# Patient Record
Sex: FEMALE | Race: WHITE | Employment: UNEMPLOYED | URBAN - METROPOLITAN AREA
[De-identification: names, ages, dates, MRNs, and addresses within clinical notes are randomized per-mention and may not be internally consistent; named-entity substitution may affect disease eponyms.]

---

## 2018-06-12 PROBLEM — Z34.90 PREGNANCY: Status: ACTIVE | Noted: 2018-06-12

## 2018-06-12 PROBLEM — Z98.891 HISTORY OF CESAREAN DELIVERY: Status: ACTIVE | Noted: 2018-06-12

## 2018-06-12 PROBLEM — O09.30 LATE PRENATAL CARE: Status: ACTIVE | Noted: 2018-06-12

## 2018-06-18 PROBLEM — N39.0 UTI (URINARY TRACT INFECTION): Status: ACTIVE | Noted: 2018-06-18

## 2018-08-09 ENCOUNTER — HOSPITAL ENCOUNTER (OUTPATIENT)
Age: 23
Setting detail: OBSERVATION
Discharge: HOME | End: 2018-08-10
Attending: OBSTETRICS & GYNECOLOGY | Admitting: OBSTETRICS & GYNECOLOGY
Payer: MEDICAID

## 2018-08-09 PROBLEM — R19.7 DIARRHEA: Status: ACTIVE | Noted: 2018-08-09

## 2018-08-09 PROBLEM — O26.899 ANTEPARTUM DEHYDRATION: Status: ACTIVE | Noted: 2018-08-09

## 2018-08-09 PROBLEM — O21.9 NAUSEA AND VOMITING DURING PREGNANCY: Status: ACTIVE | Noted: 2018-08-09

## 2018-08-09 PROBLEM — E86.0 ANTEPARTUM DEHYDRATION: Status: ACTIVE | Noted: 2018-08-09

## 2018-08-09 PROBLEM — E87.6 HYPOKALEMIA DUE TO LOSS OF POTASSIUM: Status: ACTIVE | Noted: 2018-08-09

## 2018-08-09 LAB
ALBUMIN SERPL-MCNC: 2.8 G/DL (ref 3.5–5)
ALBUMIN/GLOB SERPL: 0.7 {RATIO} (ref 1.2–3.5)
ALP SERPL-CCNC: 91 U/L (ref 50–136)
ALT SERPL-CCNC: 20 U/L (ref 12–65)
ANION GAP SERPL CALC-SCNC: 15 MMOL/L (ref 7–16)
AST SERPL-CCNC: 18 U/L (ref 15–37)
BILIRUB SERPL-MCNC: 0.3 MG/DL (ref 0.2–1.1)
BUN SERPL-MCNC: 9 MG/DL (ref 6–23)
CALCIUM SERPL-MCNC: 8.1 MG/DL (ref 8.3–10.4)
CHLORIDE SERPL-SCNC: 103 MMOL/L (ref 98–107)
CO2 SERPL-SCNC: 20 MMOL/L (ref 21–32)
CREAT SERPL-MCNC: 0.46 MG/DL (ref 0.6–1)
GLOBULIN SER CALC-MCNC: 4.2 G/DL (ref 2.3–3.5)
GLUCOSE SERPL-MCNC: 84 MG/DL (ref 65–100)
POTASSIUM SERPL-SCNC: 3.4 MMOL/L (ref 3.5–5.1)
PROT SERPL-MCNC: 7 G/DL (ref 6.3–8.2)
SODIUM SERPL-SCNC: 138 MMOL/L (ref 136–145)

## 2018-08-09 PROCEDURE — 74011250637 HC RX REV CODE- 250/637: Performed by: OBSTETRICS & GYNECOLOGY

## 2018-08-09 PROCEDURE — 96360 HYDRATION IV INFUSION INIT: CPT

## 2018-08-09 PROCEDURE — 77030020254 HC SOL INJ D5LR LACTATED RINGER

## 2018-08-09 PROCEDURE — 74011000250 HC RX REV CODE- 250: Performed by: OBSTETRICS & GYNECOLOGY

## 2018-08-09 PROCEDURE — 80053 COMPREHEN METABOLIC PANEL: CPT

## 2018-08-09 PROCEDURE — 59025 FETAL NON-STRESS TEST: CPT

## 2018-08-09 PROCEDURE — 99218 HC RM OBSERVATION: CPT

## 2018-08-09 PROCEDURE — 96374 THER/PROPH/DIAG INJ IV PUSH: CPT

## 2018-08-09 PROCEDURE — 96375 TX/PRO/DX INJ NEW DRUG ADDON: CPT | Performed by: OBSTETRICS & GYNECOLOGY

## 2018-08-09 PROCEDURE — 96368 THER/DIAG CONCURRENT INF: CPT

## 2018-08-09 PROCEDURE — 96366 THER/PROPH/DIAG IV INF ADDON: CPT

## 2018-08-09 PROCEDURE — 96361 HYDRATE IV INFUSION ADD-ON: CPT

## 2018-08-09 PROCEDURE — 74011258636 HC RX REV CODE- 258/636: Performed by: OBSTETRICS & GYNECOLOGY

## 2018-08-09 PROCEDURE — 74011250636 HC RX REV CODE- 250/636: Performed by: OBSTETRICS & GYNECOLOGY

## 2018-08-09 PROCEDURE — 96376 TX/PRO/DX INJ SAME DRUG ADON: CPT

## 2018-08-09 PROCEDURE — 99285 EMERGENCY DEPT VISIT HI MDM: CPT

## 2018-08-09 PROCEDURE — 96365 THER/PROPH/DIAG IV INF INIT: CPT

## 2018-08-09 RX ORDER — DIPHENOXYLATE HYDROCHLORIDE AND ATROPINE SULFATE 2.5; .025 MG/1; MG/1
1 TABLET ORAL
Status: DISCONTINUED | OUTPATIENT
Start: 2018-08-09 | End: 2018-08-10 | Stop reason: HOSPADM

## 2018-08-09 RX ORDER — DEXTROSE, SODIUM CHLORIDE, SODIUM LACTATE, POTASSIUM CHLORIDE, AND CALCIUM CHLORIDE 5; .6; .31; .03; .02 G/100ML; G/100ML; G/100ML; G/100ML; G/100ML
150 INJECTION, SOLUTION INTRAVENOUS CONTINUOUS
Status: DISCONTINUED | OUTPATIENT
Start: 2018-08-09 | End: 2018-08-10 | Stop reason: HOSPADM

## 2018-08-09 RX ORDER — POTASSIUM CHLORIDE 14.9 MG/ML
20 INJECTION INTRAVENOUS ONCE
Status: COMPLETED | OUTPATIENT
Start: 2018-08-09 | End: 2018-08-09

## 2018-08-09 RX ORDER — SODIUM CHLORIDE 0.9 % (FLUSH) 0.9 %
5-10 SYRINGE (ML) INJECTION AS NEEDED
Status: DISCONTINUED | OUTPATIENT
Start: 2018-08-09 | End: 2018-08-10 | Stop reason: HOSPADM

## 2018-08-09 RX ORDER — SODIUM CHLORIDE, SODIUM LACTATE, POTASSIUM CHLORIDE, CALCIUM CHLORIDE 600; 310; 30; 20 MG/100ML; MG/100ML; MG/100ML; MG/100ML
999 INJECTION, SOLUTION INTRAVENOUS CONTINUOUS
Status: DISCONTINUED | OUTPATIENT
Start: 2018-08-09 | End: 2018-08-09

## 2018-08-09 RX ORDER — ACETAMINOPHEN 325 MG/1
650 TABLET ORAL
Status: DISCONTINUED | OUTPATIENT
Start: 2018-08-09 | End: 2018-08-10 | Stop reason: HOSPADM

## 2018-08-09 RX ORDER — ONDANSETRON 2 MG/ML
4 INJECTION INTRAMUSCULAR; INTRAVENOUS
Status: COMPLETED | OUTPATIENT
Start: 2018-08-09 | End: 2018-08-09

## 2018-08-09 RX ORDER — DEXTROSE, SODIUM CHLORIDE, SODIUM LACTATE, POTASSIUM CHLORIDE, AND CALCIUM CHLORIDE 5; .6; .31; .03; .02 G/100ML; G/100ML; G/100ML; G/100ML; G/100ML
250 INJECTION, SOLUTION INTRAVENOUS CONTINUOUS
Status: DISCONTINUED | OUTPATIENT
Start: 2018-08-09 | End: 2018-08-09

## 2018-08-09 RX ORDER — DIPHENOXYLATE HYDROCHLORIDE AND ATROPINE SULFATE 2.5; .025 MG/1; MG/1
2 TABLET ORAL ONCE
Status: COMPLETED | OUTPATIENT
Start: 2018-08-09 | End: 2018-08-09

## 2018-08-09 RX ORDER — CALCIUM CARBONATE 200(500)MG
1 TABLET,CHEWABLE ORAL AS NEEDED
COMMUNITY
End: 2018-10-29

## 2018-08-09 RX ORDER — ONDANSETRON 2 MG/ML
4 INJECTION INTRAMUSCULAR; INTRAVENOUS ONCE
Status: COMPLETED | OUTPATIENT
Start: 2018-08-09 | End: 2018-08-09

## 2018-08-09 RX ORDER — PROMETHAZINE HYDROCHLORIDE 12.5 MG/1
25 TABLET ORAL
Qty: 10 TAB | Refills: 0 | Status: SHIPPED | OUTPATIENT
Start: 2018-08-09 | End: 2018-08-22 | Stop reason: ALTCHOICE

## 2018-08-09 RX ORDER — SODIUM CHLORIDE 0.9 % (FLUSH) 0.9 %
5-10 SYRINGE (ML) INJECTION EVERY 8 HOURS
Status: DISCONTINUED | OUTPATIENT
Start: 2018-08-09 | End: 2018-08-10 | Stop reason: HOSPADM

## 2018-08-09 RX ADMIN — ONDANSETRON 4 MG: 2 INJECTION, SOLUTION INTRAMUSCULAR; INTRAVENOUS at 14:02

## 2018-08-09 RX ADMIN — DIPHENOXYLATE HYDROCHLORIDE AND ATROPINE SULFATE 1 TABLET: 2.5; .025 TABLET ORAL at 18:12

## 2018-08-09 RX ADMIN — SODIUM CHLORIDE, SODIUM LACTATE, POTASSIUM CHLORIDE, CALCIUM CHLORIDE, AND DEXTROSE MONOHYDRATE 150 ML/HR: 600; 310; 30; 20; 5 INJECTION, SOLUTION INTRAVENOUS at 18:13

## 2018-08-09 RX ADMIN — DIPHENOXYLATE HYDROCHLORIDE AND ATROPINE SULFATE 2 TABLET: 2.5; .025 TABLET ORAL at 15:22

## 2018-08-09 RX ADMIN — PROMETHAZINE HYDROCHLORIDE 12.5 MG: 25 INJECTION INTRAMUSCULAR; INTRAVENOUS at 23:26

## 2018-08-09 RX ADMIN — SODIUM CHLORIDE, SODIUM LACTATE, POTASSIUM CHLORIDE, AND CALCIUM CHLORIDE 999 ML/HR: 600; 310; 30; 20 INJECTION, SOLUTION INTRAVENOUS at 13:55

## 2018-08-09 RX ADMIN — PROMETHAZINE HYDROCHLORIDE 12.5 MG: 25 INJECTION INTRAMUSCULAR; INTRAVENOUS at 18:13

## 2018-08-09 RX ADMIN — SODIUM CHLORIDE, SODIUM LACTATE, POTASSIUM CHLORIDE, CALCIUM CHLORIDE, AND DEXTROSE MONOHYDRATE 500 ML/HR: 600; 310; 30; 20; 5 INJECTION, SOLUTION INTRAVENOUS at 15:08

## 2018-08-09 RX ADMIN — FAMOTIDINE 20 MG: 10 INJECTION INTRAVENOUS at 17:45

## 2018-08-09 RX ADMIN — POTASSIUM CHLORIDE 20 MEQ: 200 INJECTION, SOLUTION INTRAVENOUS at 15:07

## 2018-08-09 RX ADMIN — POTASSIUM CHLORIDE 20 MEQ: 200 INJECTION, SOLUTION INTRAVENOUS at 18:13

## 2018-08-09 RX ADMIN — ONDANSETRON 4 MG: 2 INJECTION, SOLUTION INTRAMUSCULAR; INTRAVENOUS at 17:10

## 2018-08-09 NOTE — PROGRESS NOTES
15:22 Medication Given diphenoxylate-atropine (LOMOTIL) tablet 2 Tab -  Dose: 2 Tab ; Route: Oral ; Scheduled Time: [address] [facility]

## 2018-08-09 NOTE — ED PROVIDER NOTES
Chief Complaint:nausea, vomiting, diarrhea      20 y.o. female  at 27w5d  weeks gestation who is seen for nausea, vomiting and diarrhea. Pt reports that her daughter has been ill with a stomach flu. The pt says she has been feeling porrly since Monday and vomiting since last night. She also reports diarrhea. Denies fever, contractions, vag bleeding or discharge. Denies abd pain other than pain with vomiting. HISTORY:    History   Sexual Activity    Sexual activity: Yes    Partners: Male     No LMP recorded (lmp unknown). Patient is pregnant. Social History     Social History    Marital status: SINGLE     Spouse name: N/A    Number of children: N/A    Years of education: N/A     Occupational History    Not on file. Social History Main Topics    Smoking status: Former Smoker     Quit date:     Smokeless tobacco: Never Used    Alcohol use No    Drug use: No    Sexual activity: Yes     Partners: Male     Other Topics Concern    Not on file     Social History Narrative       Past Surgical History:   Procedure Laterality Date     DELIVERY ONLY      HX TONSILLECTOMY         Past Medical History:   Diagnosis Date    Anemia     Ill-defined condition     hypoglycemia    Neurological disorder     headaches         ROS:  A 12 point review of symptoms negative except for chief complaint as described above. PHYSICAL EXAM:  Blood pressure 122/73, pulse (!) 104, temperature 99 °F (37.2 °C), resp. rate 18, height 5' 2\" (1.575 m), weight 72.6 kg (160 lb), unknown if currently breastfeeding. Constitutional: The patient appears well, alert, oriented x 3. Cardiovascular: Heart RRR, no murmurs.    Respiratory: Lungs clear, no respiratory distress  GI: Abdomen soft, nontender, no guarding  No fundal tenderness  Musculoskeletal: no cva tenderness  Upper ext: no edema, reflexes +2  Lower ext: no edema, neg omer's, reflexes +2  Skin: no rashes or lesions  Psychiatric:Mood/ Affect: appropriate  Genitourinary: SVE:deferred  FHT:appropriate gest age  TOCO:no contractions  UA +++ketones, sp grav greater than 1.025     I personally reviewed pt's medical record including relevant labs and ultrasounds    Assessment/Plan:  21 yo  at 27w5d with gastroenteritis with n, v, d.  With dehydration and low potassium  Treated with iv fluids, iv zofran, iv potassium, lomotil  rx for phenergan for at home; instructions for bland diet  F/u if not improved  Reactive nst

## 2018-08-09 NOTE — IP AVS SNAPSHOT
Summary of Care Report The Summary of Care report has been created to help improve care coordination. Users with access to [employer] or [employer] [address] (Web-based application) may access additional patient information including the Discharge Summary. If you are not currently a [address] user and need more information, please call the number listed below in the [place] [address] section and ask to be connected with Medical Records. Facility Information Name Address Phone [address] [place] [address] [address] [place] [id number] [phone] Patient Information Patient Name Sex GRETTA Torres ([id number]) Female 1995 Discharge Information Admitting Provider Service Area Unit Ladonna Amaya MD / [id number] [facility] [address] / [phone] Discharge Provider Discharge Date/Time Discharge Disposition Destination (none) 2018 (Pending) AHR (none) Patient Language Language ENGLISH [13] Hospital Problems as of 2018  Reviewed: 2018  4:14 PM by Babar Crockett DO Class Noted - Resolved Last Modified POA Active Problems Nausea and vomiting during pregnancy  2018 - Present 2018 by Ladonna Amaya MD Unknown Entered by Ladonna Amaya MD  
  Antepartum dehydration  2018 - Present 2018 by Ladonna Amaya MD Unknown Entered by Ladonna Amaya MD  
  
Non-Hospital Problems as of 2018  Reviewed: 2018  4:14 PM by Babar Crockett DO Class Noted - Resolved Last Modified Active Problems Late prenatal care  2018 - Present 2018 by Christian Chapman Entered by Rhea Carlin Overview Addendum 2018  8:15 AM by Christian Chapman Late [facility] @ 19-wks, due to irregular cycles per patient. Aware of late prenatal care protocol. UDS sent 18:negative Pregnancy  2018 - Present 2018 by Carolina Bullock Entered by Carolina Bullock Overview Signed 2018  3:00 PM by Carolina Bullock 28-wk GTT: 
  
  
  History of  delivery  2018 - Present 2018 by Carolina Bullock Entered by Carolina Bullock Overview Addendum 2018  3:09 PM by Carolina Bullock See op note (media tab) from [address], [place]. Patient would like to discuss [address] UTI (urinary tract infection)  2018 - Present 2018 by Simeon Garcia Entered by Simeon Garcia Overview Addendum 2018  1:53 PM by Simeon Garcia Treated -repeat at next visit- You are allergic to the following Allergen Reactions Amoxicillin Hives Cefprozil Hives Ciprofloxacin Hives Keflex (Cephalexin) Hives Pcn (Penicillins) Hives Sulfa (Sulfonamide Antibiotics) Hives Zithromax (Azithromycin) Hives Current Discharge Medication List  
  
START taking these medications Dose & Instructions Dispensing Information Comments  
 promethazine 12.5 mg tablet Commonly known as:  PHENERGAN Dose:  25 mg Take 2 Tabs by mouth every eight (8) hours as needed for Nausea. Quantity:  10 Tab Refills:  0 CONTINUE these medications which have NOT CHANGED Dose & Instructions Dispensing Information Comments  
 calcium carbonate 200 mg calcium (500 mg) Chew Commonly known as:  TUMS Dose:  1 Tab Take 1 Tab by mouth as needed. Refills:  0 PRENATAL DHA+COMPLETE PRENATAL -300 mg-mcg-mg Cmpk Generic drug:  [id number] adriana-folic-dha Take  by mouth. Refills:  0  
   
 TYLENOL 325 mg tablet Generic drug:  acetaminophen Take  by mouth every four (4) hours as needed for Pain. Refills:  0 Follow-up Information Follow up With Details Comments Contact Info None   None ([phone]) Patient stated that they have no PCP Discharge Instructions Dehydration: Care Instructions Your Care Instructions Dehydration happens when your body loses too much fluid. This might happen when you do not drink enough water or you lose large amounts of fluids from your body because of diarrhea, vomiting, or sweating. Severe dehydration can be life-threatening. Water and minerals called electrolytes help put your body fluids back in balance. Learn the early signs of fluid loss, and drink more fluids to prevent dehydration. Follow-up care is a key part of your treatment and safety. Be sure to make and go to all appointments, and call your doctor if you are having problems. It's also a good idea to know your test results and keep a list of the medicines you take. How can you care for yourself at home? · To prevent dehydration, drink plenty of fluids, enough so that your urine is light yellow or clear like water. Choose water and other caffeine-free clear liquids until you feel better. If you have kidney, heart, or liver disease and have to limit fluids, talk with your doctor before you increase the amount of fluids you drink. · If you do not feel like eating or drinking, try taking small sips of water, sports drinks, or other rehydration drinks. · Get plenty of rest. 
To prevent dehydration · Add more fluids to your diet and daily routine, unless your doctor has told you not to. · During hot weather, drink more fluids. Drink even more fluids if you exercise a lot. Stay away from drinks with alcohol or caffeine. · Watch for the symptoms of dehydration. These include: ¨ A dry, sticky mouth. ¨ Dark yellow urine, and not much of it. ¨ Dry and sunken eyes. ¨ Feeling very tired. · Learn what problems can lead to dehydration. These include: ¨ Diarrhea, fever, and vomiting. ¨ Any illness with a fever, such as pneumonia or the flu. ¨ Activities that cause heavy sweating, such as endurance races and heavy outdoor work in hot or humid weather. ¨ Alcohol or drug abuse or withdrawal. 
¨ Certain medicines, such as cold and allergy pills (antihistamines), diet pills (diuretics), and laxatives. ¨ Certain diseases, such as diabetes, cancer, and heart or kidney disease. When should you call for help? Call 911 anytime you think you may need emergency care. For example, call if: 
  · You passed out (lost consciousness).  
 Call your doctor now or seek immediate medical care if: 
  · You are confused and cannot think clearly.  
  · You are dizzy or lightheaded, or you feel like you may faint.  
  · You have signs of needing more fluids. You have sunken eyes and a dry mouth, and you pass only a little dark urine.  
  · You cannot keep fluids down.  
 Watch closely for changes in your health, and be sure to contact your doctor if: 
  · You are not making tears.  
  · Your skin is very dry and sags slowly back into place after you pinch it.  
  · Your mouth and eyes are very dry. Where can you learn more? Go to http://chandler-annalise.info/. Enter Z817 in the search box to learn more about \"Dehydration: Care Instructions. \" Current as of: 2017 Content Version: 11.7 © 4311-6669 [employer]. Care instructions adapted under license by [employer] (which disclaims liability or warranty for this information). If you have questions about a medical condition or this instruction, always ask your healthcare professional. [place] [address] any warranty or liability for your use of this information. Pregnancy Precautions: Care Instructions Your Care Instructions There is no sure way to prevent labor before your due date ( labor) or to prevent most other pregnancy problems.  But there are things you can do to increase your chances of a healthy pregnancy. Go to your appointments, follow your doctor's advice, and take good care of yourself. Eat well, and exercise (if your doctor agrees). And make sure to drink plenty of water. Follow-up care is a key part of your treatment and safety. Be sure to make and go to all appointments, and call your doctor if you are having problems. It's also a good idea to know your test results and keep a list of the medicines you take. How can you care for yourself at home? · Make sure you go to your prenatal appointments. At each visit, your doctor will check your blood pressure. Your doctor will also check to see if you have protein in your urine. High blood pressure and protein in urine are signs of preeclampsia. This condition can be dangerous for you and your baby. · Drink plenty of fluids, enough so that your urine is light yellow or clear like water. Dehydration can cause contractions. If you have kidney, heart, or liver disease and have to limit fluids, talk with your doctor before you increase the amount of fluids you drink. · Tell your doctor right away if you notice any symptoms of an infection, such as: ¨ Burning when you urinate. ¨ A foul-smelling discharge from your vagina. ¨ Vaginal itching. ¨ Unexplained fever. ¨ Unusual pain or soreness in your uterus or lower belly. · Eat a balanced diet. Include plenty of foods that are high in calcium and iron. ¨ Foods high in calcium include milk, cheese, yogurt, almonds, and broccoli. ¨ Foods high in iron include red meat, shellfish, poultry, eggs, beans, raisins, whole-grain bread, and leafy green vegetables. · Do not smoke. If you need help quitting, talk to your doctor about stop-smoking programs and medicines. These can increase your chances of quitting for good. · Do not drink alcohol or use illegal drugs. · Follow your doctor's directions about activity.  Your doctor will let you know how much, if any, exercise you can do. · Ask your doctor if you can have sex. If you are at risk for early labor, your doctor may ask you to not have sex. · Take care to prevent falls. During pregnancy, your joints are loose, and your balance is off. Sports such as bicycling, skiing, or in-line skating can increase your risk of falling. And don't ride horses or motorcycles, dive, water ski, scuba dive, or parachute jump while you are pregnant. · Avoid getting very hot. Do not use saunas or hot tubs. Avoid staying out in the sun in hot weather for long periods. Take acetaminophen (Tylenol) to lower a high fever. · Do not take any over-the-counter or herbal medicines or supplements without talking to your doctor or pharmacist first. 
When should you call for help? Call 911 anytime you think you may need emergency care. For example, call if: 
  · You passed out (lost consciousness).  
  · You have severe vaginal bleeding.  
  · You have severe pain in your belly or pelvis.  
  · You have had fluid gushing or leaking from your vagina and you know or think the umbilical cord is bulging into your vagina. If this happens, immediately get down on your knees so your rear end (buttocks) is higher than your head. This will decrease the pressure on the cord until help arrives.  
[facility] your doctor now or seek immediate medical care if: 
  · You have signs of preeclampsia, such as: 
¨ Sudden swelling of your face, hands, or feet. ¨ New vision problems (such as dimness or blurring). ¨ A severe headache.  
  · You have any vaginal bleeding.  
  · You have belly pain or cramping.  
  · You have a fever.  
  · You have had regular contractions (with or without pain) for an hour. This means that you have 8 or more within 1 hour or 4 or more in 20 minutes after you change your position and drink fluids.  
  · You have a sudden release of fluid from your vagina.   · You have low back pain or pelvic pressure that does not go away.  
  · You notice that your baby has stopped moving or is moving much less than normal.  
 Watch closely for changes in your health, and be sure to contact your doctor if you have any problems. Where can you learn more? Go to http://chandler-annalise.info/. Enter [id number] in the search box to learn more about \"Pregnancy Precautions: Care Instructions. \" Current as of: November 21, 2017 Content Version: 11.7 © 0044-8712 [employer]. Care instructions adapted under license by [email or web address] (which disclaims liability or warranty for this information). If you have questions about a medical condition or this instruction, always ask your healthcare professional. [address] any warranty or liability for your use of this information. Chart Review Routing History No Routing History on File

## 2018-08-09 NOTE — PROGRESS NOTES
15:07 Medication Stopped lactated Ringers infusion -  Route: IntraVENous ; Line: Peripheral IV 08/09/18 Right Hand ; Scheduled Time: 1507 [address]   15:07 Medication New Bag potassium chloride 20 mEq in 100 ml IVPB -  Dose: 20 mEq ; Rate: 50 mL/hr ; Route: IntraVENous ; Line: Peripheral IV 08/09/18 Right Hand ; Scheduled Time: 1500 [address]   15:08 Medication New Bag dextrose 5% lactated ringers infusion -  Dose: 500 mL/hr ; Rate: 500 mL/hr ; Route: IntraVENous ; Line: Peripheral IV 08/09/18 Right Hand ; Scheduled Time: 1500

## 2018-08-09 NOTE — IP AVS SNAPSHOT
[address] [facility] [address] [facility] 
[phone] Patient: Mahsa Davis MRN: [id number] :1995 A check bri indicates which time of day the medication should be taken. My Medications START taking these medications Instructions Each Dose to Equal  
 Morning Noon Evening Bedtime  
 promethazine 12.5 mg tablet Commonly known as:  PHENERGAN Your last dose was: Your next dose is: Take 2 Tabs by mouth every eight (8) hours as needed for Nausea. 25 mg CONTINUE taking these medications Instructions Each Dose to Equal  
 Morning Noon Evening Bedtime  
 calcium carbonate 200 mg calcium (500 mg) Chew Commonly known as:  TUMS Your last dose was: Your next dose is: Take 1 Tab by mouth as needed. 1 Tab PRENATAL DHA+COMPLETE PRENATAL -300 mg-mcg-mg Cmpk Generic drug:  [id number] adriana-folic-dha Your last dose was: Your next dose is: Take  by mouth. TYLENOL 325 mg tablet Generic drug:  acetaminophen Your last dose was: Your next dose is: Take  by mouth every four (4) hours as needed for Pain. Where to Get Your Medications Information on where to get these meds will be given to you by the nurse or doctor. ! Ask your nurse or doctor about these medications  
  promethazine 12.5 mg tablet

## 2018-08-09 NOTE — PROGRESS NOTES
14:02 Medication Given ondansetron (ZOFRAN) injection 4 mg -  Dose: 4 mg ; Route: IntraVENous ; Line: Peripheral IV 08/09/18 Right Hand ; Scheduled Time: [address] [facility] [place]

## 2018-08-09 NOTE — PROGRESS NOTES
13:55 Medication New Bag lactated Ringers infusion -  Dose: 999 mL/hr ; Rate: 999 mL/hr ; Route: IntraVENous ; Line: Peripheral IV 08/09/18 Right Hand ; Scheduled Time: 1400

## 2018-08-09 NOTE — IP AVS SNAPSHOT
[address] [place]  
[phone] Patient: Linda Velarde MRN: [id number] :1995 About your hospitalization You were admitted on:  2018 You last received care in the:  [id number] [facility] You were discharged on:  2018 Why you were hospitalized Your primary diagnosis was:  Not on File Your diagnoses also included:  Nausea And Vomiting During Pregnancy, Antepartum Dehydration Follow-up Information Follow up With Details Comments Contact Info None   None ([phone]) Patient stated that they have no PCP Your Scheduled Appointments 2018  8:30 AM EDT Glucola Test with [facility] LAB [facility] ([place] [address]) [address] [id number] [phone] 2018  8:45 AM EDT  
ESTIMATED FETAL WEIGHT US with UPS US 2 [facility] ([place] [address]) [address] [facility] [id number] [phone] 2018  9:15 AM EDT  
OB VISIT with Cornell Praks MD  
[facility] ([place] [address]) [address] [address] [phone] Discharge Orders None A check rbi indicates which time of day the medication should be taken. My Medications START taking these medications Instructions Each Dose to Equal  
 Morning Noon Evening Bedtime  
 promethazine 12.5 mg tablet Commonly known as:  PHENERGAN Your last dose was: Your next dose is: Take 2 Tabs by mouth every eight (8) hours as needed for Nausea. 25 mg CONTINUE taking these medications Instructions Each Dose to Equal  
 Morning Noon Evening Bedtime  
 calcium carbonate 200 mg calcium (500 mg) Chew Commonly known as:  TUMS Your last dose was: Your next dose is: Take 1 Tab by mouth as needed. 1 Tab PRENATAL DHA+COMPLETE PRENATAL -300 mg-mcg-mg Cmpk Generic drug:  [id number] adriana-folic-dha Your last dose was: Your next dose is: Take  by mouth. TYLENOL 325 mg tablet Generic drug:  acetaminophen Your last dose was: Your next dose is: Take  by mouth every four (4) hours as needed for Pain. Where to Get Your Medications Information on where to get these meds will be given to you by the nurse or doctor. ! Ask your nurse or doctor about these medications  
  promethazine 12.5 mg tablet Discharge Instructions Dehydration: Care Instructions Your Care Instructions Dehydration happens when your body loses too much fluid. This might happen when you do not drink enough water or you lose large amounts of fluids from your body because of diarrhea, vomiting, or sweating. Severe dehydration can be life-threatening. Water and minerals called electrolytes help put your body fluids back in balance. Learn the early signs of fluid loss, and drink more fluids to prevent dehydration. Follow-up care is a key part of your treatment and safety. Be sure to make and go to all appointments, and call your doctor if you are having problems. It's also a good idea to know your test results and keep a list of the medicines you take. How can you care for yourself at home? · To prevent dehydration, drink plenty of fluids, enough so that your urine is light yellow or clear like water. Choose water and other caffeine-free clear liquids until you feel better. If you have kidney, heart, or liver disease and have to limit fluids, talk with your doctor before you increase the amount of fluids you drink.  
· If you do not feel like eating or drinking, try taking small sips of water, sports drinks, or other rehydration drinks. · Get plenty of rest. 
To prevent dehydration · Add more fluids to your diet and daily routine, unless your doctor has told you not to. · During hot weather, drink more fluids. Drink even more fluids if you exercise a lot. Stay away from drinks with alcohol or caffeine. · Watch for the symptoms of dehydration. These include: ¨ A dry, sticky mouth. ¨ Dark yellow urine, and not much of it. ¨ Dry and sunken eyes. ¨ Feeling very tired. · Learn what problems can lead to dehydration. These include: ¨ Diarrhea, fever, and vomiting. ¨ Any illness with a fever, such as pneumonia or the flu. ¨ Activities that cause heavy sweating, such as endurance races and heavy outdoor work in hot or humid weather. ¨ Alcohol or drug abuse or withdrawal. 
¨ Certain medicines, such as cold and allergy pills (antihistamines), diet pills (diuretics), and laxatives. ¨ Certain diseases, such as diabetes, cancer, and heart or kidney disease. When should you call for help? Call 911 anytime you think you may need emergency care. For example, call if: 
  · You passed out (lost consciousness).  
 Call your doctor now or seek immediate medical care if: 
  · You are confused and cannot think clearly.  
  · You are dizzy or lightheaded, or you feel like you may faint.  
  · You have signs of needing more fluids. You have sunken eyes and a dry mouth, and you pass only a little dark urine.  
  · You cannot keep fluids down.  
 Watch closely for changes in your health, and be sure to contact your doctor if: 
  · You are not making tears.  
  · Your skin is very dry and sags slowly back into place after you pinch it.  
  · Your mouth and eyes are very dry. Where can you learn more? Go to http://chandler-annalise.info/. Enter Q149 in the search box to learn more about \"Dehydration: Care Instructions. \" Current as of: November 20, 2017 Content Version: 11.7 © 0342-1979 [employer]. Care instructions adapted under license by [employer] (which disclaims liability or warranty for this information). If you have questions about a medical condition or this instruction, always ask your healthcare professional. [address] any warranty or liability for your use of this information. Pregnancy Precautions: Care Instructions Your Care Instructions There is no sure way to prevent labor before your due date ( labor) or to prevent most other pregnancy problems. But there are things you can do to increase your chances of a healthy pregnancy. Go to your appointments, follow your doctor's advice, and take good care of yourself. Eat well, and exercise (if your doctor agrees). And make sure to drink plenty of water. Follow-up care is a key part of your treatment and safety. Be sure to make and go to all appointments, and call your doctor if you are having problems. It's also a good idea to know your test results and keep a list of the medicines you take. How can you care for yourself at home? · Make sure you go to your prenatal appointments. At each visit, your doctor will check your blood pressure. Your doctor will also check to see if you have protein in your urine. High blood pressure and protein in urine are signs of preeclampsia. This condition can be dangerous for you and your baby. · Drink plenty of fluids, enough so that your urine is light yellow or clear like water. Dehydration can cause contractions. If you have kidney, heart, or liver disease and have to limit fluids, talk with your doctor before you increase the amount of fluids you drink. · Tell your doctor right away if you notice any symptoms of an infection, such as: ¨ Burning when you urinate. ¨ A foul-smelling discharge from your vagina. ¨ Vaginal itching. ¨ Unexplained fever. ¨ Unusual pain or soreness in your uterus or lower belly. · Eat a balanced diet. Include plenty of foods that are high in calcium and iron. ¨ Foods high in calcium include milk, cheese, yogurt, almonds, and broccoli. ¨ Foods high in iron include red meat, shellfish, poultry, eggs, beans, raisins, whole-grain bread, and leafy green vegetables. · Do not smoke. If you need help quitting, talk to your doctor about stop-smoking programs and medicines. These can increase your chances of quitting for good. · Do not drink alcohol or use illegal drugs. · Follow your doctor's directions about activity. Your doctor will let you know how much, if any, exercise you can do. · Ask your doctor if you can have sex. If you are at risk for early labor, your doctor may ask you to not have sex. · Take care to prevent falls. During pregnancy, your joints are loose, and your balance is off. Sports such as bicycling, skiing, or in-line skating can increase your risk of falling. And don't ride horses or motorcycles, dive, water ski, scuba dive, or parachute jump while you are pregnant. · Avoid getting very hot. Do not use saunas or hot tubs. Avoid staying out in the sun in hot weather for long periods. Take acetaminophen (Tylenol) to lower a high fever. · Do not take any over-the-counter or herbal medicines or supplements without talking to your doctor or pharmacist first. 
When should you call for help? Call 911 anytime you think you may need emergency care. For example, call if: 
  · You passed out (lost consciousness).  
  · You have severe vaginal bleeding.  
  · You have severe pain in your belly or pelvis.  
  · You have had fluid gushing or leaking from your vagina and you know or think the umbilical cord is bulging into your vagina. If this happens, immediately get down on your knees so your rear end (buttocks) is higher than your head. This will decrease the pressure on the cord until help arrives.  
[facility] your doctor now or seek immediate medical care if:   · You have signs of preeclampsia, such as: 
¨ Sudden swelling of your face, hands, or feet. ¨ New vision problems (such as dimness or blurring). ¨ A severe headache.  
  · You have any vaginal bleeding.  
  · You have belly pain or cramping.  
  · You have a fever.  
  · You have had regular contractions (with or without pain) for an hour. This means that you have 8 or more within 1 hour or 4 or more in 20 minutes after you change your position and drink fluids.  
  · You have a sudden release of fluid from your vagina.  
  · You have low back pain or pelvic pressure that does not go away.  
  · You notice that your baby has stopped moving or is moving much less than normal.  
 Watch closely for changes in your health, and be sure to contact your doctor if you have any problems. Where can you learn more? Go to http://chandler-annalise.info/. Enter [id number] in the search box to learn more about \"Pregnancy Precautions: Care Instructions. \" Current as of: November 21, 2017 Content Version: 11.7 © 7781-5109 NI. Care instructions adapted under license by [email or web address] (which disclaims liability or warranty for this information). If you have questions about a medical condition or this instruction, always ask your healthcare professional. [address] any warranty or liability for your use of this information. Introducing [facility]! Dear Jerzy Yung: Thank you for requesting a My eShoe account. Our records indicate that you already have an active My eShoe account. You can access your account anytime at [email or web address] Did you know that you can access your hospital and ER discharge instructions at any time in My eShoe? You can also review all of your test results from your hospital stay or ER visit. Additional Information If you have questions, please visit the Frequently Asked Questions section of the [employer] website at [email or web address] Remember, [employer] is NOT to be used for urgent needs. For medical emergencies, dial 911. Now available from your iPhone and Android! Introducing [employer] As a [facility] patient, I wanted to make you aware of our electronic visit tool called [employer]. Startupeando 24/7 allows you to connect within minutes with a medical provider 24 hours a day, seven days a week via a mobile device or tablet or logging into a secure website from your computer. You can access [employer] from anywhere in the [place]. A virtual visit might be right for you when you have a simple condition and feel like you just dont want to get out of bed, or cant get away from work for an appointment, when your regular [facility] provider is not available (evenings, weekends or holidays), or when youre out of town and need minor care. Electronic visits cost only $49 and if the [facility] provider determines a prescription is needed to treat your condition, one can be electronically transmitted to a nearby pharmacy*. Please take a moment to enroll today if you have not already done so. The enrollment process is free and takes just a few minutes. To enroll, please download the [employer] marixa to your tablet or phone, or visit [email or web address] to enroll on your computer. And, as an [address] [place] [address] patient with a [employer] account, the results of your visits will be scanned into your electronic medical record and your primary care provider will be able to view the scanned results. We urge you to continue to see your regular [facility] provider for your ongoing medical care. And while your primary care provider may not be the one available when you seek a Rico [employer]ples virtual visit, the peace of mind you get from getting a real diagnosis real time can be priceless. For more information on [employer], view our Frequently Asked Questions (FAQs) at [email or web address]. Sincerely, 
 
Beau Powell MD 
Chief Medical Officer [employer] *:  certain medications cannot be prescribed via Rico [employer] Providers Seen During Your Hospitalization Provider Specialty Primary office phone Matt Abdul MD Obstetrics & Gynecology [phone] Your Primary Care Physician (PCP) Primary Care Physician Office Phone Office Fax NONE ** None ** ** None ** You are allergic to the following Allergen Reactions Amoxicillin Hives Cefprozil Hives Ciprofloxacin Hives Keflex (Cephalexin) Hives Pcn (Penicillins) Hives Sulfa (Sulfonamide Antibiotics) Hives Zithromax (Azithromycin) Hives Recent Documentation Height Weight BMI OB Status Smoking Status 1.575 m 72.6 kg 29.26 kg/m2 Pregnant Former Smoker Emergency Contacts Name Discharge Info Relation Home Work Mobile [address] [place] [address] Partner [phone] Patient Belongings The following personal items are in your possession at time of discharge: 
                             
 
  
  
 Please provide this summary of care documentation to your next provider. Signatures-by signing, you are acknowledging that this After Visit Summary has been reviewed with you and you have received a copy. Patient Signature:  ____________________________________________________________ Date:  ____________________________________________________________  
  
[facility] Provider Signature:  ____________________________________________________________ Date:  ____________________________________________________________

## 2018-08-09 NOTE — PROGRESS NOTES
Patient to [facility] with c/o \"stomach bug\" with N/V and diarrhea since Tuesday. Unable to keep food/fluid down. Patient states as soon as she stopped producing appropriate amount of urine, she came.

## 2018-08-09 NOTE — H&P
History & Physical    Name: Allan Denis MRN: [id number]  SSN: [id number]    YOB: 1995  Age: 20 y.o. Sex: female      Subjective:     Reason for Admission:  Pregnancy and nausea, vomiting, diarrhea with dehydration and hypokalemia    History of Present Illness: Ms. Keiry Marinelli is a 20 y. o.female with an estimated gestational age of 33w11d with Estimated Date of Delivery: 11/3/18. Pt with nausea, vomiting , dehydration, hypokalemia. Pt continuing to vomit with diarrhea even after 8 mg iv zofran and po lomotil. No abd pain or fever. No contractions. No vag discharge or bleeding. OB History    Para Term  AB Living   2 1 1   1   SAB TAB Ectopic Molar Multiple Live Births        1      # Outcome Date GA Lbr Richardson/2nd Weight Sex Delivery Anes PTL Lv   2 Current            1 Term 07/02/15 40w6d  3.685 kg F CS-Unspec   ANTWON      Birth Comments: [facility]        Past Medical History:   Diagnosis Date    Anemia     Ill-defined condition     hypoglycemia    Neurological disorder     headaches     Past Surgical History:   Procedure Laterality Date     DELIVERY ONLY      HX TONSILLECTOMY       Social History     Occupational History    Not on file. Social History Main Topics    Smoking status: Former Smoker     Quit date:     Smokeless tobacco: Never Used    Alcohol use No    Drug use: No    Sexual activity: Yes     Partners: Male      Family History   Problem Relation Age of Onset    Asthma Mother     No Known Problems Father     Congenital heart defect Brother        Allergies   Allergen Reactions    Amoxicillin Hives    Cefprozil Hives    Ciprofloxacin Hives    Keflex [Cephalexin] Hives    Pcn [Penicillins] Hives    Sulfa (Sulfonamide Antibiotics) Hives    Zithromax [Azithromycin] Hives     Prior to Admission medications    Medication Sig Start Date End Date Taking?  Authorizing Provider   calcium carbonate (TUMS) 200 mg calcium (500 mg) chew Take 1 Tab by mouth as needed. Yes Historical Provider   promethazine (PHENERGAN) 12.5 mg tablet Take 2 Tabs by mouth every eight (8) hours as needed for Nausea. 18  Yes Francois Cortez MD   [id number] adriana-folic-dha (PRENATAL DHA+COMPLETE PRENATAL) -402 mg-mcg-mg cmpk Take  by mouth. Yes Historical Provider   acetaminophen (TYLENOL) 325 mg tablet Take  by mouth every four (4) hours as needed for Pain. Yes Historical Provider        Review of Systems:  A comprehensive review of systems was negative except for that written in the History of Present Illness. Objective:     Vitals:    Vitals:    18 1342 18 1347   BP:  122/73   Pulse:  (!) 104   Resp:  18   Temp:  99 °F (37.2 °C)   Weight: 72.6 kg (160 lb)    Height: 5' 2\" (1.575 m)       Temp (24hrs), Av °F (37.2 °C), Min:99 °F (37.2 °C), Max:99 °F (37.2 °C)    BP  Min: 122/73  Max: 122/73     Physical Exam:  Patient without distress.   Heart: Regular rate and rhythm  Lung: clear to auscultation throughout lung fields, no wheezes, no rales, no rhonchi and normal respiratory effort  Back: costovertebral angle tenderness absent  Abdomen: soft, nontender  Fundus: soft and non tender  Perineum: blood absent, amniotic fluid absent  Lower Extremities:  - Edema No   - Patellar Reflexes: 2+ bilaterally     Membranes:  Intact  Uterine Activity:  None  Fetal Heart Rate:  Reactive       Lab/Data Review:  Recent Results (from the past 24 hour(s))   METABOLIC PANEL, COMPREHENSIVE    Collection Time: 18  1:59 PM   Result Value Ref Range    Sodium 138 136 - 145 mmol/L    Potassium 3.4 (L) 3.5 - 5.1 mmol/L    Chloride 103 98 - 107 mmol/L    CO2 20 (L) 21 - 32 mmol/L    Anion gap 15 7 - 16 mmol/L    Glucose 84 65 - 100 mg/dL    BUN 9 6 - 23 MG/DL    Creatinine 0.46 (L) 0.6 - 1.0 MG/DL    GFR est AA >60 >60 ml/min/1.73m2    GFR est non-AA >60 >60 ml/min/1.73m2    Calcium 8.1 (L) 8.3 - 10.4 MG/DL    Bilirubin, total 0.3 0.2 - 1.1 MG/DL    ALT (SGPT) 20 12 - 65 U/L    AST (SGOT) 18 15 - 37 U/L    Alk. phosphatase 91 50 - 136 U/L    Protein, total 7.0 6.3 - 8.2 g/dL    Albumin 2.8 (L) 3.5 - 5.0 g/dL    Globulin 4.2 (H) 2.3 - 3.5 g/dL    A-G Ratio 0.7 (L) 1.2 - 3.5         Assessment and Plan:      Active Problems:    Nausea and vomiting during pregnancy (8/9/2018)      Antepartum dehydration (8/9/2018)      Diarrhea (8/9/2018)      Hypokalemia due to loss of potassium (8/9/2018)       Admit for iv hydration and replacement of potassium with pepcid, phenergan, lomotil, zofran  Anticipate d/c in am.    Signed By:  Kellee Alonso MD     August 9, 2018

## 2018-08-09 NOTE — DISCHARGE INSTRUCTIONS
Dehydration: Care Instructions  Your Care Instructions  Dehydration happens when your body loses too much fluid. This might happen when you do not drink enough water or you lose large amounts of fluids from your body because of diarrhea, vomiting, or sweating. Severe dehydration can be life-threatening. Water and minerals called electrolytes help put your body fluids back in balance. Learn the early signs of fluid loss, and drink more fluids to prevent dehydration. Follow-up care is a key part of your treatment and safety. Be sure to make and go to all appointments, and call your doctor if you are having problems. It's also a good idea to know your test results and keep a list of the medicines you take. How can you care for yourself at home? · To prevent dehydration, drink plenty of fluids, enough so that your urine is light yellow or clear like water. Choose water and other caffeine-free clear liquids until you feel better. If you have kidney, heart, or liver disease and have to limit fluids, talk with your doctor before you increase the amount of fluids you drink. · If you do not feel like eating or drinking, try taking small sips of water, sports drinks, or other rehydration drinks. · Get plenty of rest.  To prevent dehydration  · Add more fluids to your diet and daily routine, unless your doctor has told you not to. · During hot weather, drink more fluids. Drink even more fluids if you exercise a lot. Stay away from drinks with alcohol or caffeine. · Watch for the symptoms of dehydration. These include:  ¨ A dry, sticky mouth. ¨ Dark yellow urine, and not much of it. ¨ Dry and sunken eyes. ¨ Feeling very tired. · Learn what problems can lead to dehydration. These include:  ¨ Diarrhea, fever, and vomiting. ¨ Any illness with a fever, such as pneumonia or the flu. ¨ Activities that cause heavy sweating, such as endurance races and heavy outdoor work in hot or humid weather.   ¨ Alcohol or drug abuse or withdrawal.  ¨ Certain medicines, such as cold and allergy pills (antihistamines), diet pills (diuretics), and laxatives. ¨ Certain diseases, such as diabetes, cancer, and heart or kidney disease. When should you call for help? Call 911 anytime you think you may need emergency care. For example, call if:    · You passed out (lost consciousness).    Call your doctor now or seek immediate medical care if:    · You are confused and cannot think clearly.     · You are dizzy or lightheaded, or you feel like you may faint.     · You have signs of needing more fluids. You have sunken eyes and a dry mouth, and you pass only a little dark urine.     · You cannot keep fluids down.    Watch closely for changes in your health, and be sure to contact your doctor if:    · You are not making tears.     · Your skin is very dry and sags slowly back into place after you pinch it.     · Your mouth and eyes are very dry. Where can you learn more? Go to http://chandler-annalise.info/. Enter M208 in the search box to learn more about \"Dehydration: Care Instructions. \"  Current as of: 2017  Content Version: 11.7  © 4693-8357 [employer]. Care instructions adapted under license by [employer] (which disclaims liability or warranty for this information). If you have questions about a medical condition or this instruction, always ask your healthcare professional. [place] [address] any warranty or liability for your use of this information. Pregnancy Precautions: Care Instructions  Your Care Instructions    There is no sure way to prevent labor before your due date ( labor) or to prevent most other pregnancy problems. But there are things you can do to increase your chances of a healthy pregnancy. Go to your appointments, follow your doctor's advice, and take good care of yourself. Eat well, and exercise (if your doctor agrees).  And make sure to drink plenty of water. Follow-up care is a key part of your treatment and safety. Be sure to make and go to all appointments, and call your doctor if you are having problems. It's also a good idea to know your test results and keep a list of the medicines you take. How can you care for yourself at home? · Make sure you go to your prenatal appointments. At each visit, your doctor will check your blood pressure. Your doctor will also check to see if you have protein in your urine. High blood pressure and protein in urine are signs of preeclampsia. This condition can be dangerous for you and your baby. · Drink plenty of fluids, enough so that your urine is light yellow or clear like water. Dehydration can cause contractions. If you have kidney, heart, or liver disease and have to limit fluids, talk with your doctor before you increase the amount of fluids you drink. · Tell your doctor right away if you notice any symptoms of an infection, such as:  ¨ Burning when you urinate. ¨ A foul-smelling discharge from your vagina. ¨ Vaginal itching. ¨ Unexplained fever. ¨ Unusual pain or soreness in your uterus or lower belly. · Eat a balanced diet. Include plenty of foods that are high in calcium and iron. ¨ Foods high in calcium include milk, cheese, yogurt, almonds, and broccoli. ¨ Foods high in iron include red meat, shellfish, poultry, eggs, beans, raisins, whole-grain bread, and leafy green vegetables. · Do not smoke. If you need help quitting, talk to your doctor about stop-smoking programs and medicines. These can increase your chances of quitting for good. · Do not drink alcohol or use illegal drugs. · Follow your doctor's directions about activity. Your doctor will let you know how much, if any, exercise you can do. · Ask your doctor if you can have sex. If you are at risk for early labor, your doctor may ask you to not have sex. · Take care to prevent falls.  During pregnancy, your joints are loose, and your balance is off. Sports such as bicycling, skiing, or in-line skating can increase your risk of falling. And don't ride horses or motorcycles, dive, water ski, scuba dive, or parachute jump while you are pregnant. · Avoid getting very hot. Do not use saunas or hot tubs. Avoid staying out in the sun in hot weather for long periods. Take acetaminophen (Tylenol) to lower a high fever. · Do not take any over-the-counter or herbal medicines or supplements without talking to your doctor or pharmacist first.  When should you call for help? Call 911 anytime you think you may need emergency care. For example, call if:    · You passed out (lost consciousness).     · You have severe vaginal bleeding.     · You have severe pain in your belly or pelvis.     · You have had fluid gushing or leaking from your vagina and you know or think the umbilical cord is bulging into your vagina. If this happens, immediately get down on your knees so your rear end (buttocks) is higher than your head. This will decrease the pressure on the cord until help arrives.   [facility] your doctor now or seek immediate medical care if:    · You have signs of preeclampsia, such as:  ¨ Sudden swelling of your face, hands, or feet. ¨ New vision problems (such as dimness or blurring). ¨ A severe headache.     · You have any vaginal bleeding.     · You have belly pain or cramping.     · You have a fever.     · You have had regular contractions (with or without pain) for an hour. This means that you have 8 or more within 1 hour or 4 or more in 20 minutes after you change your position and drink fluids.     · You have a sudden release of fluid from your vagina.     · You have low back pain or pelvic pressure that does not go away.     · You notice that your baby has stopped moving or is moving much less than normal.    Watch closely for changes in your health, and be sure to contact your doctor if you have any problems. Where can you learn more?   Go to http://chandler-annalise.info/. Enter [id number] in the search box to learn more about \"Pregnancy Precautions: Care Instructions. \"  Current as of: November 21, 2017  Content Version: 11.7  © 8838-7276 [employer]. Care instructions adapted under license by [employer] (which disclaims liability or warranty for this information). If you have questions about a medical condition or this instruction, always ask your healthcare professional. [place] [address] any warranty or liability for your use of this information.

## 2018-08-10 VITALS
HEART RATE: 83 BPM | TEMPERATURE: 98.7 F | RESPIRATION RATE: 18 BRPM | HEIGHT: 62 IN | SYSTOLIC BLOOD PRESSURE: 108 MMHG | DIASTOLIC BLOOD PRESSURE: 66 MMHG | BODY MASS INDEX: 29.44 KG/M2 | WEIGHT: 160 LBS

## 2018-08-10 PROCEDURE — 99218 HC RM OBSERVATION: CPT

## 2018-08-10 PROCEDURE — 74011258636 HC RX REV CODE- 258/636: Performed by: OBSTETRICS & GYNECOLOGY

## 2018-08-10 RX ADMIN — SODIUM CHLORIDE, SODIUM LACTATE, POTASSIUM CHLORIDE, CALCIUM CHLORIDE, AND DEXTROSE MONOHYDRATE 150 ML/HR: 600; 310; 30; 20; 5 INJECTION, SOLUTION INTRAVENOUS at 02:45

## 2018-08-10 NOTE — PROGRESS NOTES
Pt given written and verbal discharge instructions, pt verbalized understanding. Pt discharged home undelivered in no apparent distress. Pt ambulatory to vehicle accompanied by RN.

## 2018-10-25 NOTE — PROGRESS NOTES
Patient ID verified. Allergies, medical history, prenatal record and prior to admission medications verified. Pt instructed to be NPO after midnight. Pt instructed to arrive at hospital @0845,come to entrance C and sign in at the registration desk on the 4th floor. Patient instructed to come to hospital sooner if SROM, labor, or concerning symptoms. Patient verbalized understanding. Questions encouraged and answered.

## 2018-10-26 ENCOUNTER — ANESTHESIA EVENT (OUTPATIENT)
Dept: LABOR AND DELIVERY | Age: 23
DRG: 540 | End: 2018-10-26
Payer: MEDICAID

## 2018-10-27 ENCOUNTER — HOSPITAL ENCOUNTER (INPATIENT)
Age: 23
LOS: 2 days | Discharge: HOME | DRG: 540 | End: 2018-10-29
Attending: OBSTETRICS & GYNECOLOGY | Admitting: OBSTETRICS & GYNECOLOGY
Payer: MEDICAID

## 2018-10-27 ENCOUNTER — ANESTHESIA (OUTPATIENT)
Dept: LABOR AND DELIVERY | Age: 23
DRG: 540 | End: 2018-10-27
Payer: MEDICAID

## 2018-10-27 DIAGNOSIS — Z98.891 HISTORY OF CESAREAN DELIVERY: Primary | ICD-10-CM

## 2018-10-27 PROBLEM — O34.219 H/O CESAREAN SECTION COMPLICATING PREGNANCY: Status: ACTIVE | Noted: 2018-10-27

## 2018-10-27 PROBLEM — Z3A.39 39 WEEKS GESTATION OF PREGNANCY: Status: ACTIVE | Noted: 2018-10-27

## 2018-10-27 LAB
ABO + RH BLD: NORMAL
ARTERIAL PATENCY WRIST A: ABNORMAL
BASE EXCESS BLD CALC-SCNC: 0 MMOL/L
BDY SITE: ABNORMAL
BLOOD GROUP ANTIBODIES SERPL: NORMAL
BODY TEMPERATURE: 98.6
ERYTHROCYTE [DISTWIDTH] IN BLOOD BY AUTOMATED COUNT: 14.7 %
GAS FLOW.O2 O2 DELIVERY SYS: ABNORMAL L/MIN
HCO3 BLD-SCNC: 25.9 MMOL/L (ref 22–26)
HCT VFR BLD AUTO: 37 % (ref 35.8–46.3)
HGB BLD-MCNC: 11.7 G/DL (ref 11.7–15.4)
MCH RBC QN AUTO: 27.1 PG (ref 26.1–32.9)
MCHC RBC AUTO-ENTMCNC: 31.6 G/DL (ref 31.4–35)
MCV RBC AUTO: 85.8 FL (ref 79.6–97.8)
NRBC # BLD: 0 K/UL (ref 0–0.2)
PCO2 BLDCO: 47 MMHG (ref 32–68)
PH BLDCO: 7.35 [PH] (ref 7.15–7.38)
PLATELET # BLD AUTO: 320 K/UL (ref 150–450)
PMV BLD AUTO: 8.4 FL (ref 9.4–12.3)
PO2 BLDCO: 17 MMHG
RBC # BLD AUTO: 4.31 M/UL (ref 4.05–5.2)
SAO2 % BLD: 21 % (ref 95–98)
SERVICE CMNT-IMP: ABNORMAL
SPECIMEN EXP DATE BLD: NORMAL
SPECIMEN TYPE: ABNORMAL
WBC # BLD AUTO: 11.3 K/UL (ref 4.3–11.1)

## 2018-10-27 PROCEDURE — 77030018836 HC SOL IRR NACL ICUM -A: Performed by: OBSTETRICS & GYNECOLOGY

## 2018-10-27 PROCEDURE — 77030032490 HC SLV COMPR SCD KNE COVD -B: Performed by: OBSTETRICS & GYNECOLOGY

## 2018-10-27 PROCEDURE — 74011250637 HC RX REV CODE- 250/637: Performed by: ANESTHESIOLOGY

## 2018-10-27 PROCEDURE — 65270000029 HC RM PRIVATE

## 2018-10-27 PROCEDURE — 77030018846 HC SOL IRR STRL H20 ICUM -A: Performed by: OBSTETRICS & GYNECOLOGY

## 2018-10-27 PROCEDURE — 86901 BLOOD TYPING SEROLOGIC RH(D): CPT

## 2018-10-27 PROCEDURE — 77030002888 HC SUT CHRMC J&J -A: Performed by: OBSTETRICS & GYNECOLOGY

## 2018-10-27 PROCEDURE — 75410000003 HC RECOV DEL/VAG/CSECN EA 0.5 HR: Performed by: OBSTETRICS & GYNECOLOGY

## 2018-10-27 PROCEDURE — 74011000250 HC RX REV CODE- 250: Performed by: ANESTHESIOLOGY

## 2018-10-27 PROCEDURE — 77030003665 HC NDL SPN BBMI -A: Performed by: ANESTHESIOLOGY

## 2018-10-27 PROCEDURE — 74011250636 HC RX REV CODE- 250/636

## 2018-10-27 PROCEDURE — 77030034696 HC CATH URETH FOL 2W BARD -A: Performed by: OBSTETRICS & GYNECOLOGY

## 2018-10-27 PROCEDURE — 74011000250 HC RX REV CODE- 250

## 2018-10-27 PROCEDURE — 85027 COMPLETE CBC AUTOMATED: CPT

## 2018-10-27 PROCEDURE — 4A1HXCZ MONITORING OF PRODUCTS OF CONCEPTION, CARDIAC RATE, EXTERNAL APPROACH: ICD-10-PCS | Performed by: OBSTETRICS & GYNECOLOGY

## 2018-10-27 PROCEDURE — 36415 COLL VENOUS BLD VENIPUNCTURE: CPT

## 2018-10-27 PROCEDURE — 74011250636 HC RX REV CODE- 250/636: Performed by: OBSTETRICS & GYNECOLOGY

## 2018-10-27 PROCEDURE — 82803 BLOOD GASES ANY COMBINATION: CPT

## 2018-10-27 PROCEDURE — 76060000078 HC EPIDURAL ANESTHESIA: Performed by: OBSTETRICS & GYNECOLOGY

## 2018-10-27 PROCEDURE — 77030007880 HC KT SPN EPDRL BBMI -B: Performed by: ANESTHESIOLOGY

## 2018-10-27 PROCEDURE — 76010000391 HC C SECN FIRST 1 HR: Performed by: OBSTETRICS & GYNECOLOGY

## 2018-10-27 PROCEDURE — 74011250636 HC RX REV CODE- 250/636: Performed by: ANESTHESIOLOGY

## 2018-10-27 PROCEDURE — 77030031139 HC SUT VCRL2 J&J -A: Performed by: OBSTETRICS & GYNECOLOGY

## 2018-10-27 RX ORDER — OXYTOCIN/RINGER'S LACTATE 30/500 ML
250 PLASTIC BAG, INJECTION (ML) INTRAVENOUS ONCE
Status: DISCONTINUED | OUTPATIENT
Start: 2018-10-27 | End: 2018-10-27 | Stop reason: HOSPADM

## 2018-10-27 RX ORDER — BUPIVACAINE HYDROCHLORIDE 7.5 MG/ML
INJECTION, SOLUTION INTRASPINAL AS NEEDED
Status: DISCONTINUED | OUTPATIENT
Start: 2018-10-27 | End: 2018-10-27 | Stop reason: HOSPADM

## 2018-10-27 RX ORDER — TRISODIUM CITRATE DIHYDRATE AND CITRIC ACID MONOHYDRATE 500; 334 MG/5ML; MG/5ML
15 SOLUTION ORAL
Status: COMPLETED | OUTPATIENT
Start: 2018-10-27 | End: 2018-10-27

## 2018-10-27 RX ORDER — DEXAMETHASONE SODIUM PHOSPHATE 100 MG/10ML
INJECTION INTRAMUSCULAR; INTRAVENOUS AS NEEDED
Status: DISCONTINUED | OUTPATIENT
Start: 2018-10-27 | End: 2018-10-27 | Stop reason: HOSPADM

## 2018-10-27 RX ORDER — ACETAMINOPHEN 500 MG
1000 TABLET ORAL 3 TIMES DAILY
Status: DISCONTINUED | OUTPATIENT
Start: 2018-10-27 | End: 2018-10-28

## 2018-10-27 RX ORDER — OXYCODONE HYDROCHLORIDE 5 MG/1
5 TABLET ORAL
Status: DISPENSED | OUTPATIENT
Start: 2018-10-27 | End: 2018-10-28

## 2018-10-27 RX ORDER — HALOPERIDOL 5 MG/ML
1 INJECTION INTRAMUSCULAR
Status: DISCONTINUED | OUTPATIENT
Start: 2018-10-27 | End: 2018-10-28

## 2018-10-27 RX ORDER — EPHEDRINE SULFATE 50 MG/ML
INJECTION, SOLUTION INTRAVENOUS AS NEEDED
Status: DISCONTINUED | OUTPATIENT
Start: 2018-10-27 | End: 2018-10-27 | Stop reason: HOSPADM

## 2018-10-27 RX ORDER — SODIUM CHLORIDE 0.9 % (FLUSH) 0.9 %
5-10 SYRINGE (ML) INJECTION AS NEEDED
Status: DISCONTINUED | OUTPATIENT
Start: 2018-10-27 | End: 2018-10-27 | Stop reason: HOSPADM

## 2018-10-27 RX ORDER — SODIUM CHLORIDE, SODIUM LACTATE, POTASSIUM CHLORIDE, CALCIUM CHLORIDE 600; 310; 30; 20 MG/100ML; MG/100ML; MG/100ML; MG/100ML
25 INJECTION, SOLUTION INTRAVENOUS CONTINUOUS
Status: DISCONTINUED | OUTPATIENT
Start: 2018-10-27 | End: 2018-10-28

## 2018-10-27 RX ORDER — KETOROLAC TROMETHAMINE 30 MG/ML
30 INJECTION, SOLUTION INTRAMUSCULAR; INTRAVENOUS EVERY 6 HOURS
Status: DISPENSED | OUTPATIENT
Start: 2018-10-27 | End: 2018-10-28

## 2018-10-27 RX ORDER — SODIUM CHLORIDE, SODIUM LACTATE, POTASSIUM CHLORIDE, CALCIUM CHLORIDE 600; 310; 30; 20 MG/100ML; MG/100ML; MG/100ML; MG/100ML
INJECTION, SOLUTION INTRAVENOUS
Status: DISCONTINUED | OUTPATIENT
Start: 2018-10-27 | End: 2018-10-27 | Stop reason: HOSPADM

## 2018-10-27 RX ORDER — ONDANSETRON 2 MG/ML
4 INJECTION INTRAMUSCULAR; INTRAVENOUS ONCE
Status: COMPLETED | OUTPATIENT
Start: 2018-10-27 | End: 2018-10-27

## 2018-10-27 RX ORDER — GENTAMICIN SULFATE 80 MG/100ML
80 INJECTION, SOLUTION INTRAVENOUS ONCE
Status: DISPENSED | OUTPATIENT
Start: 2018-10-27 | End: 2018-10-27

## 2018-10-27 RX ORDER — CLINDAMYCIN PHOSPHATE 900 MG/50ML
900 INJECTION INTRAVENOUS ONCE
Status: COMPLETED | OUTPATIENT
Start: 2018-10-27 | End: 2018-10-27

## 2018-10-27 RX ORDER — SODIUM CHLORIDE 0.9 % (FLUSH) 0.9 %
5-10 SYRINGE (ML) INJECTION AS NEEDED
Status: DISCONTINUED | OUTPATIENT
Start: 2018-10-27 | End: 2018-10-28

## 2018-10-27 RX ORDER — SODIUM CHLORIDE 0.9 % (FLUSH) 0.9 %
5-10 SYRINGE (ML) INJECTION EVERY 8 HOURS
Status: DISCONTINUED | OUTPATIENT
Start: 2018-10-27 | End: 2018-10-28

## 2018-10-27 RX ORDER — KETOROLAC TROMETHAMINE 30 MG/ML
INJECTION, SOLUTION INTRAMUSCULAR; INTRAVENOUS AS NEEDED
Status: DISCONTINUED | OUTPATIENT
Start: 2018-10-27 | End: 2018-10-27 | Stop reason: HOSPADM

## 2018-10-27 RX ORDER — NALBUPHINE HYDROCHLORIDE 10 MG/ML
2.5 INJECTION, SOLUTION INTRAMUSCULAR; INTRAVENOUS; SUBCUTANEOUS
Status: DISPENSED | OUTPATIENT
Start: 2018-10-27 | End: 2018-10-28

## 2018-10-27 RX ORDER — OXYTOCIN/RINGER'S LACTATE 30/500 ML
PLASTIC BAG, INJECTION (ML) INTRAVENOUS
Status: DISCONTINUED | OUTPATIENT
Start: 2018-10-27 | End: 2018-10-27 | Stop reason: HOSPADM

## 2018-10-27 RX ORDER — MORPHINE SULFATE 0.5 MG/ML
INJECTION, SOLUTION EPIDURAL; INTRATHECAL; INTRAVENOUS AS NEEDED
Status: DISCONTINUED | OUTPATIENT
Start: 2018-10-27 | End: 2018-10-27 | Stop reason: HOSPADM

## 2018-10-27 RX ORDER — HYDROMORPHONE HYDROCHLORIDE 2 MG/ML
0.5 INJECTION, SOLUTION INTRAMUSCULAR; INTRAVENOUS; SUBCUTANEOUS
Status: DISPENSED | OUTPATIENT
Start: 2018-10-27 | End: 2018-10-28

## 2018-10-27 RX ORDER — SODIUM CHLORIDE 0.9 % (FLUSH) 0.9 %
5-10 SYRINGE (ML) INJECTION EVERY 8 HOURS
Status: DISCONTINUED | OUTPATIENT
Start: 2018-10-27 | End: 2018-10-27 | Stop reason: HOSPADM

## 2018-10-27 RX ORDER — DEXTROSE, SODIUM CHLORIDE, SODIUM LACTATE, POTASSIUM CHLORIDE, AND CALCIUM CHLORIDE 5; .6; .31; .03; .02 G/100ML; G/100ML; G/100ML; G/100ML; G/100ML
125 INJECTION, SOLUTION INTRAVENOUS CONTINUOUS
Status: DISCONTINUED | OUTPATIENT
Start: 2018-10-27 | End: 2018-10-27 | Stop reason: HOSPADM

## 2018-10-27 RX ORDER — ONDANSETRON 2 MG/ML
4 INJECTION INTRAMUSCULAR; INTRAVENOUS
Status: DISCONTINUED | OUTPATIENT
Start: 2018-10-27 | End: 2018-10-28

## 2018-10-27 RX ORDER — SIMETHICONE 80 MG
80 TABLET,CHEWABLE ORAL
Status: DISCONTINUED | OUTPATIENT
Start: 2018-10-28 | End: 2018-10-29 | Stop reason: HOSPADM

## 2018-10-27 RX ADMIN — KETOROLAC TROMETHAMINE 30 MG: 30 INJECTION, SOLUTION INTRAMUSCULAR at 18:21

## 2018-10-27 RX ADMIN — NALBUPHINE HYDROCHLORIDE 2.5 MG: 10 INJECTION, SOLUTION INTRAMUSCULAR; INTRAVENOUS; SUBCUTANEOUS at 21:58

## 2018-10-27 RX ADMIN — SODIUM CHLORIDE, SODIUM LACTATE, POTASSIUM CHLORIDE, CALCIUM CHLORIDE: 600; 310; 30; 20 INJECTION, SOLUTION INTRAVENOUS at 11:18

## 2018-10-27 RX ADMIN — DEXAMETHASONE SODIUM PHOSPHATE 10 MG: 100 INJECTION INTRAMUSCULAR; INTRAVENOUS at 10:59

## 2018-10-27 RX ADMIN — MORPHINE SULFATE 200 MCG: 0.5 INJECTION, SOLUTION EPIDURAL; INTRATHECAL; INTRAVENOUS at 10:36

## 2018-10-27 RX ADMIN — SODIUM CHLORIDE, SODIUM LACTATE, POTASSIUM CHLORIDE, CALCIUM CHLORIDE: 600; 310; 30; 20 INJECTION, SOLUTION INTRAVENOUS at 10:31

## 2018-10-27 RX ADMIN — FAMOTIDINE 20 MG: 10 INJECTION, SOLUTION INTRAVENOUS at 10:01

## 2018-10-27 RX ADMIN — BUPIVACAINE HYDROCHLORIDE 1.8 ML: 7.5 INJECTION, SOLUTION INTRASPINAL at 10:36

## 2018-10-27 RX ADMIN — KETOROLAC TROMETHAMINE 30 MG: 30 INJECTION, SOLUTION INTRAMUSCULAR; INTRAVENOUS at 10:59

## 2018-10-27 RX ADMIN — SODIUM CITRATE AND CITRIC ACID MONOHYDRATE 15 ML: 500; 334 SOLUTION ORAL at 10:02

## 2018-10-27 RX ADMIN — ACETAMINOPHEN 1000 MG: 500 TABLET, FILM COATED ORAL at 22:00

## 2018-10-27 RX ADMIN — OXYCODONE HYDROCHLORIDE 5 MG: 5 TABLET ORAL at 18:21

## 2018-10-27 RX ADMIN — EPHEDRINE SULFATE 10 MG: 50 INJECTION, SOLUTION INTRAVENOUS at 11:18

## 2018-10-27 RX ADMIN — OXYCODONE HYDROCHLORIDE 5 MG: 5 TABLET ORAL at 13:48

## 2018-10-27 RX ADMIN — ONDANSETRON 4 MG: 2 INJECTION INTRAMUSCULAR; INTRAVENOUS at 10:01

## 2018-10-27 RX ADMIN — OXYCODONE HYDROCHLORIDE 5 MG: 5 TABLET ORAL at 23:12

## 2018-10-27 RX ADMIN — CLINDAMYCIN PHOSPHATE 900 MG: 900 INJECTION, SOLUTION INTRAVENOUS at 10:08

## 2018-10-27 RX ADMIN — Medication 250 ML/HR: at 10:54

## 2018-10-27 NOTE — ANESTHESIA PREPROCEDURE EVALUATION
Anesthetic History Review of Systems / Medical History Patient summary reviewed and pertinent labs reviewed Pulmonary Within defined limits Neuro/Psych Within defined limits Cardiovascular Exercise tolerance: >4 METS 
  
GI/Hepatic/Renal 
Within defined limits Endo/Other Obesity Other Findings Physical Exam 
 
Airway Mallampati: III 
TM Distance: 4 - 6 cm Neck ROM: normal range of motion Mouth opening: Normal 
 
 Cardiovascular Rhythm: regular Rate: normal 
 
 
Pertinent negatives: No murmur Dental 
No notable dental hx Pulmonary Breath sounds clear to auscultation Abdominal 
 
 
 
 Other Findings Anesthetic Plan ASA: 2 Anesthesia type: spinal 
 
 
 
 
 
Anesthetic plan and risks discussed with: Patient

## 2018-10-27 NOTE — PROGRESS NOTES
13:48 Medication Given oxyCODONE IR (ROXICODONE) tablet 5 mg -  Dose: 5 mg ; Route: Oral  Norberto Neal RN  
18:21 Medication Given ketorolac (TORADOL) injection 30 mg -  Dose: 30 mg ; Route: IntraVENous ; Line: Peripheral IV 10/27/18 Right Forearm ; Scheduled Time: 1800  Norberto Neal RN  
18:21 Medication Given oxyCODONE IR (ROXICODONE) tablet 5 mg -  Dose: 5 mg ; Route: Oral  Norberto Neal RN  
 
Pt wants to get OOB at 1. Per Aakash Bell we may do so at pt request.  Tiffani Bell done. Medicated for pain.

## 2018-10-27 NOTE — PROGRESS NOTES
Long cath removed per Dr Felicity Stroud order and pt request. 100 ml clear fluid removed with syringe.

## 2018-10-27 NOTE — ANESTHESIA PROCEDURE NOTES
Spinal Block Start time: 10/27/2018 10:32 AM 
End time: 10/27/2018 10:36 AM 
Performed by: Elise Hare MD 
Authorized by: Elise Hare MD  
 
Pre-procedure: Indications: primary anesthetic  Preanesthetic Checklist: patient identified, risks and benefits discussed, anesthesia consent, patient being monitored and timeout performed Timeout Time: 10:32 Spinal Block:  
Patient Position:  Seated Prep Region:  Lumbar Prep: chlorhexidine and patient draped Location:  L3-4 Technique:  Single shot Local Dose (mL):  3 Needle:  
Needle Type:  Pencan Needle Gauge:  25 G Attempts:  1 Events: CSF confirmed, no blood with aspiration and no paresthesia Assessment: 
Insertion:  Uncomplicated Patient tolerance:  Patient tolerated the procedure well with no immediate complications 1.8cc heavy bupiv injected with 200mcg duramorph

## 2018-10-27 NOTE — OP NOTES
[facility]  [id number]      INTRAUTERINE PREGNANCY  SECTION FULL OP NOTE        DATE OF PROCEDURE:  10/27/2018    PREOPERATIVE DIAGNOSIS:  gabriel 2018 Repeat  Section    POSTOPERATIVE DIAGNOSIS:  Scheduled repeat  section     ADDITIONAL DIAGNOSES: Viable male infant    PROCEDURE: Low transverse  section    SURGEON:  Tierra Tapia MD    ASSISTANT:  none    ANESTHESIA: Spinal    EBL: 914 cc    COMPLICATIONS: none    OPERATIVE PROCEDURE: Patient was placed on the operating room table in the supine position/left lateral tilt. Time out was done to confirm the operating procedure, surgeon, patient and site. Once confirmed by the team, procedure was started. After having adequate regional anesthesia by spinal injection, the patient was prepped and draped in the usual fashion for abdominal surgery. A Pfannenstiel incision was made and carried down sharply through the skin, subcutaneous tissue, and fascia. Fascia was sharply dissected free from underlying rectus muscles. The peritoneum was sharply entered and extended vertically. DeLee bladder blade was then placed over the bladder. The visceroperitoneal reflection over the lower uterine segment was incised transversely and the bladder flap sharply and bluntly developed. The uterus was incised transversely, then extended bluntly, and the infants head was delivered without difficulty and fundal pressure. Fluid was clear. Cord was clamped and cut. Mouth and nose were suctioned clean. The infant was given to the NICU personnel present at the time of delivery. The placenta was expressed, intact on inspection. The uterus was exteriorized, wrapped in a wet lap square, curetted with a dry square, and closed in a double-layered fashion with #1 chromic. Hemostasis appeared adequate. The cul-de-sac was then irrigated and suctioned clean. The uterus was placed in the abdomen. The gutters were irrigated and suctioned clean.  The peritoneum and rectus muscles were closed with 2-0 chromic. The fascia was closed with 0 vicryl. Running 2-0 plain was used to reapproximate the subcutaneous tissue. 4-0 Vicryl was used in a subcuticular stitch. The patient tolerated the procedure well and went to the recovery room in satisfactory condition.

## 2018-10-27 NOTE — H&P
History & Physical 
 
Name: Tawana Jovel MRN: [id number]  SSN: [id number] YOB: 1995  Age: 24 y.o. Sex: female Subjective:  
 
Estimated Date of Delivery: 11/3/18 OB History  Para Term  AB Living 2 1 1     1 SAB TAB Ectopic Molar Multiple Live Births 1  
  
# Outcome Date GA Lbr Richardson/2nd Weight Sex Delivery Anes PTL Lv  
2 Current 1 Term 07/02/15 40w6d  3.685 kg F CS-Unspec   ANTWON Birth Comments: Edra Matters  
  
 
 
Ms. Shay Davis is admitted with pregnancy at 39w0d for  section due to previous  section. Prenatal course was normal. Please see prenatal records for details. Past Medical History:  
Diagnosis Date  Anemia  Ill-defined condition   
 hypoglycemia  Neurological disorder   
 headaches Past Surgical History:  
Procedure Laterality Date   DELIVERY ONLY    
 HX TONSILLECTOMY   Social History Occupational History  Not on file Tobacco Use  Smoking status: Former Smoker Last attempt to quit:  Years since quittin.8  Smokeless tobacco: Never Used Substance and Sexual Activity  Alcohol use: No  
 Drug use: No  
 Sexual activity: Yes  
  Partners: Male Family History Problem Relation Age of Onset  Asthma Mother  No Known Problems Father  Congenital heart defect Brother Allergies Allergen Reactions  Amoxicillin Hives  Cefprozil Hives  Ciprofloxacin Hives  Keflex [Cephalexin] Hives  Pcn [Penicillins] Hives  Sulfa (Sulfonamide Antibiotics) Hives  Zithromax [Azithromycin] Hives Prior to Admission medications Medication Sig Start Date End Date Taking? Authorizing Provider  
calcium carbonate (TUMS) 200 mg calcium (500 mg) chew Take 1 Tab by mouth as needed.    Yes Provider, Historical  
[id number] adriana-folic-dha (PRENATAL DHA+COMPLETE PRENATAL) [id number] mg-mcg-mg cmpk Take  by mouth. Yes Provider, Historical  
acetaminophen (TYLENOL) 325 mg tablet Take  by mouth every four (4) hours as needed for Pain. Provider, Historical  
  
 
Review of Systems Objective:  
 
Vitals: 
Vitals:  
 10/27/18 5671 10/27/18 0919 BP:  114/78 Pulse:  89 Resp:  18 Temp:  99.4 °F (37.4 °C) Weight: 82.1 kg (181 lb) Height: 5' 2\" (1.575 m) Physical Exam: 
Physical Exam 
Cervical Exam: Closed/Thick/High 
Membranes: Intact Fetal Heart Rate: Reactive Prenatal Labs:  
Lab Results Component Value Date/Time ABO/Rh(D) PENDING 10/27/2018 09:22 AM  
 Rubella, External immune 2018 HBsAg, External negative 2018 HIV, External NR 2018 RPR, External NR 2018 ABO,Rh O positive 2018 Impression/Plan: Active Problems: H/O  section complicating pregnancy () 39 weeks gestation of pregnancy (10/27/2018) Plan:  Admit for  section. Group B Strep was negative. Discussed the risks of surgery including the risks of bleeding, infection, deep vein thrombosis, and surgical injuries to internal organs including but not limited to the bowels, bladder, rectum, and female reproductive organs. The patient understands the risks; any and all questions were answered to the patient's satisfaction. Signed By:  Meka Mcclelland MD   
 2018

## 2018-10-27 NOTE — PROGRESS NOTES
10/27/18 1829 Urine Assessment Urine Color Yellow/straw Urine Appearance Clear Postpartum Assessment Fundus Firm Fundus location At umbilicus; Midline Lochia Pad change;Small;Rubra States she wants to get up after 1900 and get moving around quickly.

## 2018-10-27 NOTE — L&D DELIVERY NOTE
Delivery Summary    Patient: Olga Collazo MRN: [id number]  SSN: [id number]    YOB: 1995  Age: 24 y.o. Sex: female        Information for the patient's :  Sommer Garcia [[id number]]       Labor Events:    Labor: No   Rupture Date: 10/27/2018   Rupture Time: 10:52 AM   Rupture Type AROM   Amniotic Fluid Volume:      Amniotic Fluid Description: Clear None   Induction: None       Augmentation: None   Labor Events: None     Cervical Ripening:     None     Delivery Events:  Episiotomy: None   Laceration(s): None     Repaired: None    Number of Repair Packets:     Suture Type and Size: None     Estimated Blood Loss (ml):  ml       Delivery Date: 10/27/2018    Delivery Time: 10:53 AM  Delivery Type: , Low Transverse   Details    Trial of Labor: No   Primary/Repeat: Repeat   Priority: Routine   Indications:      Incision type:     Sex:  Male     Gestational Age: 39w0d  Delivery Clinician:  Fernanda Crocker  Living Status: Living   Delivery Location: OR            APGARS  One minute Five minutes Ten minutes   Skin color: 1   1        Heart rate: 2   2        Grimace: 2   2        Muscle tone: 2   2        Breathin   2        Totals: 9   9          Presentation: Vertex    Position:        Resuscitation Method:  Suctioning-bulb; Tactile Stimulation     Meconium Stained: None      Cord Information: 3 Vessels  Complications: None  Cord around:    Delayed cord clamping? No  Cord clamped date/time:10/27/2018 10:53 AM  Disposition of Cord Blood: Lab    Blood Gases Sent?: Yes    Placenta:  Date/Time: 10/27/2018 10:54 AM  Removal:        Appearance: Normal     [place] Measurements:  Birth Weight: 3.14 kg      Birth Length: 49.5 cm      Head Circumference: 34 cm      Chest Circumference: 33 cm     Abdominal Girth:       Other Providers:   CAM OZUNA;ROLO OSMAN;ANGELA LOPEZ;BECKY CHAKRABORTY;KALPANA MÉNDEZ;RADHA TITUS;MESHA OLEARY;NORMA TRISTAN [place], Obstetrician;Primary Nurse;Primary  Nurse;Neonatologist;Anesthesiologist;Crna;Respiratory Therapist;Scrub Tech;Scrub Tech             Group B Strep: No results found for: GRBSEXT, GRBSEXT  Information for the patient's :  [facility] [[id number]]   No results found for: ABORH, PCTABR, PCTDIG, BILI, ABORHEXT, ABORH    No results found for: APH, APCO2, APO2, AHCO3, ABEC, ABDC, O2ST, EPHV, PCO2V, PO2V, HCO3V, EBEV, EBDV, SITE, RSCOM

## 2018-10-28 LAB
BASOPHILS # BLD: 0 K/UL (ref 0–0.2)
BASOPHILS NFR BLD: 0 % (ref 0–2)
DIFFERENTIAL METHOD BLD: ABNORMAL
EOSINOPHIL # BLD: 0.1 K/UL (ref 0–0.8)
EOSINOPHIL NFR BLD: 0 % (ref 0.5–7.8)
ERYTHROCYTE [DISTWIDTH] IN BLOOD BY AUTOMATED COUNT: 14.5 %
HCT VFR BLD AUTO: 31.5 % (ref 35.8–46.3)
HGB BLD-MCNC: 9.8 G/DL (ref 11.7–15.4)
IMM GRANULOCYTES # BLD: 0.1 K/UL (ref 0–0.5)
IMM GRANULOCYTES NFR BLD AUTO: 0 % (ref 0–5)
LYMPHOCYTES # BLD: 2.9 K/UL (ref 0.5–4.6)
LYMPHOCYTES NFR BLD: 18 % (ref 13–44)
MCH RBC QN AUTO: 27.1 PG (ref 26.1–32.9)
MCHC RBC AUTO-ENTMCNC: 31.1 G/DL (ref 31.4–35)
MCV RBC AUTO: 87.3 FL (ref 79.6–97.8)
MONOCYTES # BLD: 1.4 K/UL (ref 0.1–1.3)
MONOCYTES NFR BLD: 9 % (ref 4–12)
NEUTS SEG # BLD: 11.3 K/UL (ref 1.7–8.2)
NEUTS SEG NFR BLD: 72 % (ref 43–78)
NRBC # BLD: 0 K/UL (ref 0–0.2)
PLATELET # BLD AUTO: 306 K/UL (ref 150–450)
PMV BLD AUTO: 8.6 FL (ref 9.4–12.3)
RBC # BLD AUTO: 3.61 M/UL (ref 4.05–5.2)
WBC # BLD AUTO: 15.8 K/UL (ref 4.3–11.1)

## 2018-10-28 PROCEDURE — 74011250637 HC RX REV CODE- 250/637: Performed by: OBSTETRICS & GYNECOLOGY

## 2018-10-28 PROCEDURE — 85025 COMPLETE CBC W/AUTO DIFF WBC: CPT

## 2018-10-28 PROCEDURE — 36415 COLL VENOUS BLD VENIPUNCTURE: CPT

## 2018-10-28 PROCEDURE — 65270000029 HC RM PRIVATE

## 2018-10-28 PROCEDURE — 74011250637 HC RX REV CODE- 250/637: Performed by: ANESTHESIOLOGY

## 2018-10-28 PROCEDURE — 74011250636 HC RX REV CODE- 250/636: Performed by: ANESTHESIOLOGY

## 2018-10-28 RX ORDER — CALCIUM CARBONATE 200(500)MG
200 TABLET,CHEWABLE ORAL
Status: DISCONTINUED | OUTPATIENT
Start: 2018-10-28 | End: 2018-10-29 | Stop reason: HOSPADM

## 2018-10-28 RX ORDER — SODIUM CHLORIDE 0.9 % (FLUSH) 0.9 %
5-10 SYRINGE (ML) INJECTION EVERY 8 HOURS
Status: DISCONTINUED | OUTPATIENT
Start: 2018-10-28 | End: 2018-10-28

## 2018-10-28 RX ORDER — NALOXONE HYDROCHLORIDE 0.4 MG/ML
0.4 INJECTION, SOLUTION INTRAMUSCULAR; INTRAVENOUS; SUBCUTANEOUS AS NEEDED
Status: DISCONTINUED | OUTPATIENT
Start: 2018-10-28 | End: 2018-10-29 | Stop reason: HOSPADM

## 2018-10-28 RX ORDER — DOCUSATE SODIUM 100 MG/1
100 CAPSULE, LIQUID FILLED ORAL 2 TIMES DAILY
Status: DISCONTINUED | OUTPATIENT
Start: 2018-10-28 | End: 2018-10-29 | Stop reason: HOSPADM

## 2018-10-28 RX ORDER — OXYCODONE AND ACETAMINOPHEN 7.5; 325 MG/1; MG/1
2 TABLET ORAL
Status: DISCONTINUED | OUTPATIENT
Start: 2018-10-28 | End: 2018-10-29 | Stop reason: HOSPADM

## 2018-10-28 RX ORDER — SODIUM CHLORIDE 0.9 % (FLUSH) 0.9 %
5-10 SYRINGE (ML) INJECTION AS NEEDED
Status: DISCONTINUED | OUTPATIENT
Start: 2018-10-28 | End: 2018-10-28

## 2018-10-28 RX ORDER — SODIUM CHLORIDE, SODIUM LACTATE, POTASSIUM CHLORIDE, CALCIUM CHLORIDE 600; 310; 30; 20 MG/100ML; MG/100ML; MG/100ML; MG/100ML
150 INJECTION, SOLUTION INTRAVENOUS CONTINUOUS
Status: DISCONTINUED | OUTPATIENT
Start: 2018-10-28 | End: 2018-10-28

## 2018-10-28 RX ORDER — ZOLPIDEM TARTRATE 5 MG/1
5 TABLET ORAL
Status: DISCONTINUED | OUTPATIENT
Start: 2018-10-28 | End: 2018-10-29 | Stop reason: HOSPADM

## 2018-10-28 RX ORDER — IBUPROFEN 800 MG/1
800 TABLET ORAL
Status: DISCONTINUED | OUTPATIENT
Start: 2018-10-28 | End: 2018-10-29 | Stop reason: HOSPADM

## 2018-10-28 RX ORDER — OXYCODONE AND ACETAMINOPHEN 7.5; 325 MG/1; MG/1
1 TABLET ORAL
Status: DISCONTINUED | OUTPATIENT
Start: 2018-10-28 | End: 2018-10-29 | Stop reason: HOSPADM

## 2018-10-28 RX ORDER — DIPHENHYDRAMINE HCL 25 MG
25 CAPSULE ORAL
Status: DISCONTINUED | OUTPATIENT
Start: 2018-10-28 | End: 2018-10-29 | Stop reason: HOSPADM

## 2018-10-28 RX ADMIN — KETOROLAC TROMETHAMINE 30 MG: 30 INJECTION, SOLUTION INTRAMUSCULAR at 09:00

## 2018-10-28 RX ADMIN — DOCUSATE SODIUM 100 MG: 100 CAPSULE, LIQUID FILLED ORAL at 16:23

## 2018-10-28 RX ADMIN — OXYCODONE HYDROCHLORIDE AND ACETAMINOPHEN 1 TABLET: 7.5; 325 TABLET ORAL at 16:23

## 2018-10-28 RX ADMIN — OXYCODONE HYDROCHLORIDE AND ACETAMINOPHEN 1 TABLET: 7.5; 325 TABLET ORAL at 20:10

## 2018-10-28 RX ADMIN — ACETAMINOPHEN 1000 MG: 500 TABLET, FILM COATED ORAL at 08:01

## 2018-10-28 RX ADMIN — Medication 10 ML: at 02:48

## 2018-10-28 RX ADMIN — SIMETHICONE CHEW TAB 80 MG 80 MG: 80 TABLET ORAL at 22:04

## 2018-10-28 RX ADMIN — NALBUPHINE HYDROCHLORIDE 2.5 MG: 10 INJECTION, SOLUTION INTRAMUSCULAR; INTRAVENOUS; SUBCUTANEOUS at 08:01

## 2018-10-28 RX ADMIN — OXYCODONE HYDROCHLORIDE 5 MG: 5 TABLET ORAL at 06:28

## 2018-10-28 RX ADMIN — OXYCODONE HYDROCHLORIDE AND ACETAMINOPHEN 2 TABLET: 7.5; 325 TABLET ORAL at 12:26

## 2018-10-28 RX ADMIN — KETOROLAC TROMETHAMINE 30 MG: 30 INJECTION, SOLUTION INTRAMUSCULAR at 02:47

## 2018-10-28 RX ADMIN — IBUPROFEN 800 MG: 800 TABLET ORAL at 22:04

## 2018-10-28 RX ADMIN — IBUPROFEN 800 MG: 800 TABLET ORAL at 16:23

## 2018-10-28 RX ADMIN — DOCUSATE SODIUM 100 MG: 100 CAPSULE, LIQUID FILLED ORAL at 08:01

## 2018-10-28 NOTE — LACTATION NOTE

## 2018-10-28 NOTE — LACTATION NOTE
This note was copied from a baby's chart. Assisted with breastfeeding in cross cradle on L and R.  Baby fed fairly well, latched easily. Demonstrated manual lip flange. Encouraged frequent feeding and watch output. Mom asking about starting to pump for storage and so older child can give baby a bottle. Discussed recommendation to wait at least 3-4 weeks before introducing a bottle if possible. Mom can start to pump for storage as desired. Suggested consistently pumping after 2 feedings each day. Noted mom wants an early discharge.

## 2018-10-28 NOTE — PROGRESS NOTES
Post-Operative Day Number 1 Progress Note Patient doing well post-op day 1 from  delivery without significant complaints. Pain controlled on current medication. Voiding without difficulty, normal lochia. Vitals:   
Patient Vitals for the past 8 hrs: 
 BP Temp Pulse Resp SpO2  
10/28/18 0630 105/60 97.7 °F (36.5 °C) 79 18   
10/28/18 0239 107/66 97.8 °F (36.6 °C) 74 18 97 % Temp (24hrs), Av.9 °F (36.6 °C), Min:97.5 °F (36.4 °C), Max:98.4 °F (36.9 °C) Vital signs stable, afebrile. Exam:  Patient without distress. Abdomen soft, fundus firm at level of umbilicus, non tender. Incision dry and clean without erythema. Lower extremities are negative for swelling, cords or tenderness. Lab/Data Review: CBC:  
Lab Results Component Value Date/Time WBC 15.8 (H) 10/28/2018 06:06 AM  
 HGB 9.8 (L) 10/28/2018 06:06 AM  
 HCT 31.5 (L) 10/28/2018 06:06 AM  
  10/28/2018 06:06 AM  
 
 
Assessment and Plan:  Patient appears to be having uncomplicated post- course. Continue routine post-op care and maternal education.

## 2018-10-28 NOTE — ANESTHESIA POSTPROCEDURE EVALUATION
Procedure(s):  SECTION. Anesthesia Post Evaluation Multimodal analgesia: multimodal analgesia used between 6 hours prior to anesthesia start to PACU discharge Patient location during evaluation: bedside Patient participation: complete - patient participated Level of consciousness: awake and responsive to light touch Pain management: adequate Airway patency: patent Anesthetic complications: no 
Cardiovascular status: acceptable, hemodynamically stable, blood pressure returned to baseline and stable Respiratory status: acceptable, unassisted, spontaneous ventilation and nonlabored ventilation Hydration status: acceptable Visit Vitals /60 (BP 1 Location: Left arm, BP Patient Position: At rest) Pulse 79 Temp 36.5 °C (97.7 °F) Resp 18 Ht 5' 2\" (1.575 m) Wt 82.1 kg (181 lb) SpO2 97% Breastfeeding? Yes  
BMI 33.11 kg/m²

## 2018-10-28 NOTE — PROGRESS NOTES
Shift assessment complete as noted. Patient given pain and itching medication, discussed plan of care including ambulation in hallway and frequent urination . Questions encouraged and answered. Encouraged to call for needs or concerns. Verbalizes understanding.

## 2018-10-28 NOTE — PROGRESS NOTES
Report received from Marshall Rivero RN care assumed. Pt in bed breastfeeding. No complaints at this time. Call light within reach.

## 2018-10-28 NOTE — LACTATION NOTE
Mother wishes to breastfeed infant now. Infant has had several successful latches throughout the day. Mother positioned for comfort. Infant latches to left breast without difficulty.

## 2018-10-28 NOTE — PROGRESS NOTES
Shift assessment complete as noted. Patient denies needs. POC discussed including pain medications. States she has just begun to hurt and rates pain 2/10. Encouraged to call should she need pain medication. Questions encouraged and answered. Encouraged to call for needs or concerns. Verbalizes understanding.

## 2018-10-28 NOTE — PROGRESS NOTES
Discussed the risks and benefits of epidural placement and use with patient including the risk of wet tap and spinal headache, inadequate analgesia, need for replacement of epidural.  After the patient agreed to proceed I ensured the patient had no contraindications to Labor epidural placement including prohibitive heart defects, hypocoagulation, family or personal history of a bleeding disorder. Epidural placement is described in the block note. Frequent monitoring in the first 20-30 minutes following initial placement was performed and patient and RN were instructed to call anytime with any questions.

## 2018-10-28 NOTE — ROUTINE PROCESS
SBAR IN Report: Mother Verbal report received from Annetta Solano RN on this patient, who is now being transferred from L&D for routine progression of care. The patient is wearing a green \"Anesthesia-Duramorph\" band. Report consisted of patient's Situation, Background, Assessment and Recommendations (SBAR). [place] ID bands were compared with the identification form, and verified with the patient and transferring nurse. Information from the SBAR and the [place] Report was reviewed with the transferring nurse; opportunity for questions and clarification provided. Patient currently walking around the room. Long and SCDs already removed, IV capped. Encouraged to attempt to void within 2 hrs and call should she need assistance.

## 2018-10-29 VITALS
HEIGHT: 62 IN | DIASTOLIC BLOOD PRESSURE: 61 MMHG | SYSTOLIC BLOOD PRESSURE: 100 MMHG | BODY MASS INDEX: 33.31 KG/M2 | HEART RATE: 83 BPM | WEIGHT: 181 LBS | RESPIRATION RATE: 16 BRPM | OXYGEN SATURATION: 98 % | TEMPERATURE: 97.5 F

## 2018-10-29 LAB
ARTERIAL PATENCY WRIST A: NORMAL
BASE DEFICIT BLD-SCNC: 2 MMOL/L
BDY SITE: NORMAL
BODY TEMPERATURE: 98.6
GAS FLOW.O2 O2 DELIVERY SYS: NORMAL L/MIN
PCO2 BLDCO: 40 MMHG (ref 32–68)
PH BLDCO: 7.36 [PH] (ref 7.15–7.38)
PO2 BLDCO: 30 MMHG
SERVICE CMNT-IMP: NORMAL
SPECIMEN TYPE: NORMAL

## 2018-10-29 PROCEDURE — 74011250637 HC RX REV CODE- 250/637: Performed by: OBSTETRICS & GYNECOLOGY

## 2018-10-29 RX ORDER — OXYCODONE AND ACETAMINOPHEN 7.5; 325 MG/1; MG/1
1 TABLET ORAL
Qty: 30 TAB | Refills: 0 | Status: SHIPPED | OUTPATIENT
Start: 2018-10-29 | End: 2018-12-18

## 2018-10-29 RX ORDER — IBUPROFEN 800 MG/1
800 TABLET ORAL
Qty: 35 TAB | Refills: 1 | Status: SHIPPED | OUTPATIENT
Start: 2018-10-29 | End: 2018-12-18

## 2018-10-29 RX ADMIN — DOCUSATE SODIUM 100 MG: 100 CAPSULE, LIQUID FILLED ORAL at 09:51

## 2018-10-29 RX ADMIN — OXYCODONE HYDROCHLORIDE AND ACETAMINOPHEN 1 TABLET: 7.5; 325 TABLET ORAL at 10:44

## 2018-10-29 RX ADMIN — IBUPROFEN 800 MG: 800 TABLET ORAL at 12:42

## 2018-10-29 RX ADMIN — SIMETHICONE CHEW TAB 80 MG 80 MG: 80 TABLET ORAL at 09:51

## 2018-10-29 RX ADMIN — OXYCODONE HYDROCHLORIDE AND ACETAMINOPHEN 1 TABLET: 7.5; 325 TABLET ORAL at 00:44

## 2018-10-29 RX ADMIN — IBUPROFEN 800 MG: 800 TABLET ORAL at 06:14

## 2018-10-29 RX ADMIN — OXYCODONE HYDROCHLORIDE AND ACETAMINOPHEN 1 TABLET: 7.5; 325 TABLET ORAL at 06:14

## 2018-10-29 RX ADMIN — SIMETHICONE CHEW TAB 80 MG 80 MG: 80 TABLET ORAL at 12:43

## 2018-10-29 NOTE — DISCHARGE INSTRUCTIONS
DISCHARGE SUMMARY from Nurse    PATIENT INSTRUCTIONS:    What to do at Home:  Recommended activity: Discharge instruction to follow: Activity: Pelvis rest for 6 weeks     No heavy lifting over 15 lbs for 2 weeks     No driving for 2 weeks     No push/pull motion such as sweeping or vacuuming for 2 weeks     No tub baths for 6 weeks     section keep incision clean and dry, may shower as normal with soap and water. Inspect incision every day for signs of infection listed below. Continue to use zain-bottle with every void or bowel movement until comfortable stopping. Change sanitary pad after each urination or bowel movement. Call MD for the following:      Fever over 101 F; pain not relieved by medication; foul smelling vaginal discharge or increase in vaginal bleeding. Redness, swelling, or drainage from  incision. Take medication as prescribed. Follow up with MD as ordered. *  Please give a list of your current medications to your Primary Care Provider. *  Please update this list whenever your medications are discontinued, doses are      changed, or new medications (including over-the-counter products) are added. *  Please carry medication information at all times in case of emergency situations. These are general instructions for a healthy lifestyle:    No smoking/ No tobacco products/ Avoid exposure to second hand smoke  Surgeon General's Warning:  Quitting smoking now greatly reduces serious risk to your health.     Obesity, smoking, and sedentary lifestyle greatly increases your risk for illness    A healthy diet, regular physical exercise & weight monitoring are important for maintaining a healthy lifestyle    You may be retaining fluid if you have a history of heart failure or if you experience any of the following symptoms:  Weight gain of 3 pounds or more overnight or 5 pounds in a week, increased swelling in our hands or feet or shortness of breath while lying flat in bed.  Please call your doctor as soon as you notice any of these symptoms; do not wait until your next office visit. Recognize signs and symptoms of STROKE:    F-face looks uneven    A-arms unable to move or move unevenly    S-speech slurred or non-existent    T-time-call 911 as soon as signs and symptoms begin-DO NOT go       Back to bed or wait to see if you get better-TIME IS BRAIN. Warning Signs of HEART ATTACK     Call 911 if you have these symptoms:   Chest discomfort. Most heart attacks involve discomfort in the center of the chest that lasts more than a few minutes, or that goes away and comes back. It can feel like uncomfortable pressure, squeezing, fullness, or pain.  Discomfort in other areas of the upper body. Symptoms can include pain or discomfort in one or both arms, the back, neck, jaw, or stomach.  Shortness of breath with or without chest discomfort.  Other signs may include breaking out in a cold sweat, nausea, or lightheadedness. Don't wait more than five minutes to call 911 - MINUTES MATTER! Fast action can save your life. Calling 911 is almost always the fastest way to get lifesaving treatment. Emergency Medical Services staff can begin treatment when they arrive -- up to an hour sooner than if someone gets to the hospital by car. The discharge information has been reviewed with the patient. The patient verbalized understanding. Discharge medications reviewed with the patient and appropriate educational materials and side effects teaching were provided. ___________________________________________________________________________________________________________________________________        Section: What to Expect at Home  Your Recovery    A  section, or , is surgery to deliver your baby through a cut, called an incision, that the doctor makes in your lower belly and uterus.   You may have some pain in your lower belly and need pain medicine for 1 to 2 weeks. You can expect some vaginal bleeding for several weeks. You will probably need about 6 weeks to fully recover. It is important to take it easy while the incision is healing. Avoid heavy lifting, strenuous activities, or exercises that strain the belly muscles while you are recovering. Ask a family member or friend for help with housework, cooking, and shopping. This care sheet gives you a general idea about how long it will take for you to recover. But each person recovers at a different pace. Follow the steps below to get better as quickly as possible. How can you care for yourself at home? Activity    · Rest when you feel tired. Getting enough sleep will help you recover.     · Try to walk each day. Start by walking a little more than you did the day before. Bit by bit, increase the amount you walk. Walking boosts blood flow and helps prevent pneumonia, constipation, and blood clots.     · Avoid strenuous activities, such as bicycle riding, jogging, weightlifting, and aerobic exercise, for 6 weeks or until your doctor says it is okay.     · Until your doctor says it is okay, do not lift anything heavier than your baby.     · Do not do sit-ups or other exercises that strain the belly muscles for 6 weeks or until your doctor says it is okay.     · Hold a pillow over your incision when you cough or take deep breaths. This will support your belly and decrease your pain.     · You may shower as usual. Pat the incision dry when you are done.     · You will have some vaginal bleeding. Wear sanitary pads. Do not douche or use tampons until your doctor says it is okay.     · Ask your doctor when you can drive again.     · You will probably need to take at least 6 weeks off work. It depends on the type of work you do and how you feel.     · Ask your doctor when it is okay for you to have sex. Diet    · You can eat your normal diet.  If your stomach is upset, try bland, low-fat foods like plain rice, broiled chicken, toast, and yogurt.     · Drink plenty of fluids (unless your doctor tells you not to).     · You may notice that your bowel movements are not regular right after your surgery. This is common. Try to avoid constipation and straining with bowel movements. You may want to take a fiber supplement every day. If you have not had a bowel movement after a couple of days, ask your doctor about taking a mild laxative.     · If you are breastfeeding, limit alcohol. Alcohol can cause a lack of energy and other health problems for the baby when a breastfeeding woman drinks heavily. It can also get in the way of a mom's ability to feed her baby or to care for the child in other ways. There isn't a lot of research about exactly how much alcohol can harm a baby. Having no alcohol is the safest choice for your baby. If you choose to have a drink now and then, have only one drink, and limit the number of occasions that you have a drink. Wait to breastfeed at least 2 hours after you have a drink to reduce the amount of alcohol the baby may get in the milk. Medicines    · Your doctor will tell you if and when you can restart your medicines. He or she will also give you instructions about taking any new medicines.     · If you take blood thinners, such as warfarin (Coumadin), clopidogrel (Plavix), or aspirin, be sure to talk to your doctor. He or she will tell you if and when to start taking those medicines again. Make sure that you understand exactly what your doctor wants you to do.     · Take pain medicines exactly as directed. ? If the doctor gave you a prescription medicine for pain, take it as prescribed. ? If you are not taking a prescription pain medicine, ask your doctor if you can take an over-the-counter medicine.     · If you think your pain medicine is making you sick to your stomach:  ? Take your medicine after meals (unless your doctor has told you not to). ? Ask your doctor for a different pain medicine.   · If your doctor prescribed antibiotics, take them as directed. Do not stop taking them just because you feel better. You need to take the full course of antibiotics. Incision care    · If you have strips of tape on the incision, leave the tape on for a week or until it falls off.     · Wash the area daily with warm, soapy water, and pat it dry. Don't use hydrogen peroxide or alcohol, which can slow healing. You may cover the area with a gauze bandage if it weeps or rubs against clothing. Change the bandage every day.     · Keep the area clean and dry. Other instructions    · If you breastfeed your baby, you may be more comfortable while you are healing if you place the baby so that he or she is not resting on your belly. Try tucking your baby under your arm, with his or her body along the side you will be feeding on. Support your baby's upper body with your arm. With that hand you can control your baby's head to bring his or her mouth to your breast. This is sometimes called the football hold. Follow-up care is a key part of your treatment and safety. Be sure to make and go to all appointments, and call your doctor if you are having problems. It's also a good idea to know your test results and keep a list of the medicines you take. When should you call for help? Call 911 anytime you think you may need emergency care. For example, call if:    · You passed out (lost consciousness).     · You have chest pain, are short of breath, or cough up blood.    Call your doctor now or seek immediate medical care if:    · You have pain that does not get better after you take pain medicine.     · You have severe vaginal bleeding.     · You are dizzy or lightheaded, or you feel like you may faint.     · You have new or worse pain in your belly or pelvis.     · You have loose stitches, or your incision comes open.     · You have symptoms of infection, such as:  ? Increased pain, swelling, warmth, or redness. ?  Red streaks leading from the incision. ? Pus draining from the incision. ? A fever.     · You have symptoms of a blood clot in your leg (called a deep vein thrombosis), such as:  ? Pain in your calf, back of the knee, thigh, or groin. ? Redness and swelling in your leg or groin.    Watch closely for changes in your health, and be sure to contact your doctor if:    · You do not get better as expected. Where can you learn more? Go to http://chandler-annalise.info/. Enter M806 in the search box to learn more about \" Section: What to Expect at Home. \"  Current as of: 2017  Content Version: 11.8  © 4191-7226 [employer]. Care instructions adapted under license by [employer] (which disclaims liability or warranty for this information). If you have questions about a medical condition or this instruction, always ask your healthcare professional. [address] any warranty or liability for your use of this information.

## 2018-10-29 NOTE — DISCHARGE SUMMARY
Obstetrical Discharge Summary     Name: Ayan Dimas MRN: [id number]  SSN: [id number]    YOB: 1995  Age: 24 y.o. Sex: female      Allergies: Amoxicillin; Cefprozil; Ciprofloxacin; Keflex [cephalexin]; Pcn [penicillins]; Sulfa (sulfonamide antibiotics); and Zithromax [azithromycin]    Admit Date: 10/27/2018    Discharge Date: 10/29/2018     Admitting Physician: Gabriela Caceres MD     Attending Physician:  Alan Allison MD     * Admission Diagnoses: gabriel 2018 Repeat  Section;39 weeks gestation of pregnancy;H/O  section complicating pregnancy    * Discharge Diagnoses:   Information for the patient's :  [facility] [[id number]]   Delivery of a 6 lb 14.8 oz (3.14 kg) male infant via , Low Transverse on 10/27/2018 at 10:53 AM  by . Apgars were 9 and 9. Additional Diagnoses:   Hospital Problems as of 10/29/2018 Date Reviewed: 10/17/2018          Codes Class Noted - Resolved POA    * (Principal) H/O  section complicating pregnancy VFF-18-TF: O34.219  ICD-9-CM: 654.20  10/27/2018 - Present Unknown        39 weeks gestation of pregnancy ICD-10-CM: Z3A.39  ICD-9-CM: V22.2  10/27/2018 - Present Unknown             Lab Results   Component Value Date/Time    ABO/Rh(D) O POSITIVE 10/27/2018 09:22 AM    Rubella, External immune 2018    ABO,Rh O positive 2018    There is no immunization history for the selected administration types on file for this patient.     * Procedures:   Procedure(s) with comments:   SECTION - gabriel 2018 Repeat  Section      Cate Pel  Depression Scale  I have been able to laugh and see the funny side of things: As much as I always could  I have looked forward with enjoyment to things: As much as I ever did  I have blamed myself unnecessarily when things went wrong: Yes, some of the time  I have been anxious or worried for no good reason: Hardly ever  I have felt scared or panicky for no very good reason: No, not at all  Things have been getting on top of me: No, I have been coping as well as ever  I have been so unhappy that I have had difficulty sleeping: No, not at all  I have felt sad or miserable: No, not at all  I have been so unhappy that I have been crying: No, never  The thought of harming myself has occurred to me: Never  Total Score: 3    * Discharge Condition: stable    * Hospital Course: Normal hospital course following the delivery. * Disposition: Home    Discharge Medications:   Current Discharge Medication List      START taking these medications    Details   ibuprofen (MOTRIN) 800 mg tablet Take 1 Tab by mouth every eight (8) hours as needed. Qty: 35 Tab, Refills: 1      oxyCODONE-acetaminophen (PERCOCET 7.5) 7.5-325 mg per tablet Take 1 Tab by mouth every four (4) hours as needed. Max Daily Amount: 6 Tabs. Qty: 30 Tab, Refills: 0    Associated Diagnoses: History of  delivery         CONTINUE these medications which have NOT CHANGED    Details   [id number] adriana-folic-dha (PRENATAL DHA+COMPLETE PRENATAL) -300 mg-mcg-mg cmpk Take  by mouth. STOP taking these medications       calcium carbonate (TUMS) 200 mg calcium (500 mg) chew Comments:   Reason for Stopping:         acetaminophen (TYLENOL) 325 mg tablet Comments:   Reason for Stopping:               * Follow-up Care/Patient Instructions:   Activity: No sex for 6 weeks  Diet: Regular Diet  Wound Care: As directed    Follow-up Information    None          Signed By:  Marita Benites DO     2018

## 2018-10-29 NOTE — LACTATION NOTE
This note was copied from a baby's chart. In to see mom and infant for discharge. Mom states infant latching and nursing well overall. Discussed w/ Pediatrician as well, but as infant nearing 10% wt loss but overall baby looks well- reviewed recommendation to mom to insurance pump 10 minutes behind every other day time feeding for next few days until milk more fully in and offer back to infant to help bring her milk in quicker and stronger and give baby a few extra calories. Mom in agreeance. Has personal pump at home to use. Reviewed discharge instructions and how to manage period of engorgement. Sent parents home with syringes and reviewed curve tip syringe finger feeding. All questions answered, monitor out. No further needs. Mom to go to breast feeding center tomorrow for further lactation support.

## 2018-10-29 NOTE — LACTATION NOTE

## 2018-10-29 NOTE — PROGRESS NOTES
Shift assessment complete see flowsheet. Discussed tonight plan of care with pt. Pt voiced understanding. Questions encouraged, no questions at this time. Pt encouraged to call with needs/concerns. EPDS filled out by pt, pt score 3. Pt states she has been up and ambulating today. Pt in bed with call light in reach.

## 2018-10-29 NOTE — PROGRESS NOTES
Bedside report received from [address] [place] [address] and Jono Brewer RN. Pt care assumed.

## 2018-10-29 NOTE — LACTATION NOTE
This note was copied from a baby's chart. Informed mother that infant was at a 9.3% weight loss and to make sure infant is feeding at least every 3hr throughout the night. Mother voiced understanding and denies any questions. Infant last fed at 2000 for 45 minutes. Infant sleeping flat on back in bassinet at this time.

## 2018-10-29 NOTE — PROGRESS NOTES
Chart reviewed - no needs identified.  made introduction to family and provided informational packet on  mood disorder education/resources. Patient denies any history of postpartum depression. Family receptive to receiving information and denied any additional needs from . Family has this 's contact information should any needs/questions arise. Stephanie [address]th [address]

## 2018-10-29 NOTE — PROGRESS NOTES
Post-Partum Day Number  2 Progress Note Patient doing well  without significant complaints. Pain controlled on current medication. Voiding without difficulty, normal lochia. Pt would like to go home today. Vitals:   
Visit Vitals /61 (BP 1 Location: Left arm, BP Patient Position: At rest) Pulse 83 Temp 97.5 °F (36.4 °C) Resp 16 Ht 5' 2\" (1.575 m) Wt 181 lb (82.1 kg) LMP  (LMP Unknown) SpO2 98% Breastfeeding? Yes  
BMI 33.11 kg/m² Vital signs stable, afebrile. Exam:  Patient without distress. Heart rrr 
Lungs cta b&s Abdomen soft, fundus firm at level of umbilicus, nontender. Incision clean, dry and intact. Lower extremities are negative for swelling, cords or tenderness. Lab Results Component Value Date/Time ABO Group O 06/12/2018 02:55 PM  
 Rh (D) Positive 06/12/2018 02:55 PM  
 ABO/Rh(D) [facility] POSITIVE 10/27/2018 09:22 AM  
 
  
Lab Results Component Value Date/Time ABO/Rh(D) O POSITIVE 10/27/2018 09:22 AM  
 Antibody screen NEG 10/27/2018 09:22 AM  
 Antibody screen, External negative 06/12/2018 Rubella, External immune 06/12/2018 ABO,Rh O positive 06/12/2018 Lab Results Component Value Date/Time HGB 9.8 (L) 10/28/2018 06:06 AM  
 Hgb, External 12.0 06/12/2018 Assessment and Plan:  Patient appears to be having uncomplicated post-partum course. Continue routine post-delivery care and maternal education. Breastfeeding. Hg borderline anemic-  Will continue pnv and iron rich diet.

## 2018-11-01 ENCOUNTER — TELEPHONE (OUTPATIENT)
Dept: MOTHER INFANT UNIT | Age: 23
End: 2018-11-01

## 2018-11-01 NOTE — TELEPHONE ENCOUNTER
Mom called stating that baby (11days old) had been feeding well but left nipple became sore overnight. Mom reports nipple is bruised and possibly cracked. Mom reports too painful to latch baby to left side. Encouraged mom to pump left side for a few feedings to allow nipple to rest, offer pumped milk to infant. Mom using coconut oil. Discussed rotating with bacitracin if nipple has open damage, gently wipe off remaining ointment prior to next feeding. Mom reports she had difficulty latching baby to right breast at last feeding. Mom states infant was fussy and \"skaking head back and forth\" at nipple. Discussed trying to latch with early feeding cues rather than waiting until infant crying to eat, allow infant to suck on finger to get into good pattern before latching, try different feeding position. Discussed with mother if unable to get infant to feed from right breast she should pump and feed pumped milk to infant. Reviewed importance of at least 8 feedings in 24 hours, watch output closely. Encouraged to call as needed.

## 2022-03-18 PROBLEM — Z98.891 HISTORY OF CESAREAN DELIVERY: Status: ACTIVE | Noted: 2018-06-12

## 2022-03-18 PROBLEM — O34.219 H/O CESAREAN SECTION COMPLICATING PREGNANCY: Status: ACTIVE | Noted: 2018-10-27

## 2022-03-18 PROBLEM — E87.6 HYPOKALEMIA DUE TO LOSS OF POTASSIUM: Status: ACTIVE | Noted: 2018-08-09

## 2022-03-18 PROBLEM — O09.30 LATE PRENATAL CARE: Status: ACTIVE | Noted: 2018-06-12

## 2022-03-19 PROBLEM — R19.7 DIARRHEA: Status: ACTIVE | Noted: 2018-08-09

## 2022-03-19 PROBLEM — Z34.90 PREGNANCY: Status: ACTIVE | Noted: 2018-06-12

## 2022-03-19 PROBLEM — N39.0 UTI (URINARY TRACT INFECTION): Status: ACTIVE | Noted: 2018-06-18

## 2022-03-19 PROBLEM — O21.9 NAUSEA AND VOMITING DURING PREGNANCY: Status: ACTIVE | Noted: 2018-08-09

## 2022-03-19 PROBLEM — Z3A.39 39 WEEKS GESTATION OF PREGNANCY: Status: ACTIVE | Noted: 2018-10-27

## 2022-03-20 PROBLEM — E86.0 ANTEPARTUM DEHYDRATION: Status: ACTIVE | Noted: 2018-08-09

## 2022-03-20 PROBLEM — O26.899 ANTEPARTUM DEHYDRATION: Status: ACTIVE | Noted: 2018-08-09

## 2022-07-16 ENCOUNTER — HOSPITAL ENCOUNTER (INPATIENT)
Age: 27
LOS: 3 days | Discharge: HOME | DRG: 872 | End: 2022-07-19
Attending: EMERGENCY MEDICINE | Admitting: FAMILY MEDICINE

## 2022-07-16 ENCOUNTER — APPOINTMENT (OUTPATIENT)
Dept: CT IMAGING | Age: 27
DRG: 872 | End: 2022-07-16

## 2022-07-16 DIAGNOSIS — N10 ACUTE PYELONEPHRITIS: Primary | ICD-10-CM

## 2022-07-16 DIAGNOSIS — A41.9 SEPTICEMIA (HCC): ICD-10-CM

## 2022-07-16 DIAGNOSIS — D72.829 LEUKOCYTOSIS, UNSPECIFIED TYPE: ICD-10-CM

## 2022-07-16 PROBLEM — F98.8 ADD (ATTENTION DEFICIT DISORDER): Status: ACTIVE | Noted: 2022-07-16

## 2022-07-16 PROBLEM — N17.9 AKI (ACUTE KIDNEY INJURY) (HCC): Status: ACTIVE | Noted: 2022-07-16

## 2022-07-16 PROBLEM — N12 PYELONEPHRITIS: Status: ACTIVE | Noted: 2022-07-16

## 2022-07-16 PROBLEM — R65.20 SEVERE SEPSIS (HCC): Status: ACTIVE | Noted: 2022-07-16

## 2022-07-16 LAB
ALBUMIN SERPL-MCNC: 2.7 G/DL (ref 3.5–5)
ALBUMIN/GLOB SERPL: 0.6 {RATIO} (ref 1.2–3.5)
ALP SERPL-CCNC: 194 U/L (ref 50–136)
ALT SERPL-CCNC: 43 U/L (ref 12–65)
ANION GAP SERPL CALC-SCNC: 6 MMOL/L (ref 7–16)
AST SERPL-CCNC: 43 U/L (ref 15–37)
BACTERIA URNS QL MICRO: ABNORMAL /HPF
BASOPHILS # BLD: 0.1 K/UL (ref 0–0.2)
BASOPHILS NFR BLD: 0 % (ref 0–2)
BILIRUB SERPL-MCNC: 0.9 MG/DL (ref 0.2–1.1)
BILIRUB UR QL: NEGATIVE
BUN SERPL-MCNC: 17 MG/DL (ref 6–23)
CALCIUM SERPL-MCNC: 8.9 MG/DL (ref 8.3–10.4)
CASTS URNS QL MICRO: 0 /LPF
CHLORIDE SERPL-SCNC: 101 MMOL/L (ref 98–107)
CO2 SERPL-SCNC: 27 MMOL/L (ref 21–32)
CREAT SERPL-MCNC: 1.7 MG/DL (ref 0.6–1)
CRYSTALS URNS QL MICRO: 0 /LPF
DIFFERENTIAL METHOD BLD: ABNORMAL
EOSINOPHIL # BLD: 0 K/UL (ref 0–0.8)
EOSINOPHIL NFR BLD: 0 % (ref 0.5–7.8)
EPI CELLS #/AREA URNS HPF: ABNORMAL /HPF
ERYTHROCYTE [DISTWIDTH] IN BLOOD BY AUTOMATED COUNT: 13.2 % (ref 11.9–14.6)
GLOBULIN SER CALC-MCNC: 4.7 G/DL (ref 2.3–3.5)
GLUCOSE SERPL-MCNC: 108 MG/DL (ref 65–100)
GLUCOSE UR QL STRIP.AUTO: NEGATIVE MG/DL
HCT VFR BLD AUTO: 40 % (ref 35.8–46.3)
HGB BLD-MCNC: 13.1 G/DL (ref 11.7–15.4)
IMM GRANULOCYTES # BLD AUTO: 0.2 K/UL (ref 0–0.5)
IMM GRANULOCYTES NFR BLD AUTO: 1 % (ref 0–5)
KETONES UR-MCNC: NEGATIVE MG/DL
LACTATE SERPL-SCNC: 1.5 MMOL/L (ref 0.4–2)
LEUKOCYTE ESTERASE UR QL STRIP: ABNORMAL
LIPASE SERPL-CCNC: 33 U/L (ref 73–393)
LYMPHOCYTES # BLD: 1.2 K/UL (ref 0.5–4.6)
LYMPHOCYTES NFR BLD: 4 % (ref 13–44)
MCH RBC QN AUTO: 30.7 PG (ref 26.1–32.9)
MCHC RBC AUTO-ENTMCNC: 32.8 G/DL (ref 31.4–35)
MCV RBC AUTO: 93.7 FL (ref 79.6–97.8)
MONOCYTES # BLD: 1 K/UL (ref 0.1–1.3)
MONOCYTES NFR BLD: 4 % (ref 4–12)
MUCOUS THREADS URNS QL MICRO: 0 /LPF
NEUTS SEG # BLD: 25.1 K/UL (ref 1.7–8.2)
NEUTS SEG NFR BLD: 91 % (ref 43–78)
NITRITE UR QL: NEGATIVE
NRBC # BLD: 0 K/UL (ref 0–0.2)
OTHER OBSERVATIONS: ABNORMAL
PH UR: 6 [PH] (ref 5–9)
PLATELET # BLD AUTO: 239 K/UL (ref 150–450)
PMV BLD AUTO: 8.7 FL (ref 9.4–12.3)
POTASSIUM SERPL-SCNC: 4.3 MMOL/L (ref 3.5–5.1)
PROCALCITONIN SERPL-MCNC: 2.92 NG/ML (ref 0–0.49)
PROT SERPL-MCNC: 7.4 G/DL (ref 6.3–8.2)
PROT UR QL: 100 MG/DL
RBC # BLD AUTO: 4.27 M/UL (ref 4.05–5.2)
RBC # UR STRIP: ABNORMAL /UL
RBC #/AREA URNS HPF: 0 /HPF
SERVICE CMNT-IMP: ABNORMAL
SODIUM SERPL-SCNC: 134 MMOL/L (ref 136–145)
SP GR UR: <=1.005 (ref 1–1.02)
UROBILINOGEN UR QL: 1 EU/DL (ref 0.2–1)
WBC # BLD AUTO: 27.6 K/UL (ref 4.3–11.1)
WBC URNS QL MICRO: >100 /HPF

## 2022-07-16 PROCEDURE — 1100000000 HC RM PRIVATE

## 2022-07-16 PROCEDURE — 85025 COMPLETE CBC W/AUTO DIFF WBC: CPT

## 2022-07-16 PROCEDURE — 87088 URINE BACTERIA CULTURE: CPT

## 2022-07-16 PROCEDURE — 6360000004 HC RX CONTRAST MEDICATION

## 2022-07-16 PROCEDURE — 96365 THER/PROPH/DIAG IV INF INIT: CPT

## 2022-07-16 PROCEDURE — 6370000000 HC RX 637 (ALT 250 FOR IP): Performed by: FAMILY MEDICINE

## 2022-07-16 PROCEDURE — 84145 PROCALCITONIN (PCT): CPT

## 2022-07-16 PROCEDURE — 2580000003 HC RX 258: Performed by: PHYSICIAN ASSISTANT

## 2022-07-16 PROCEDURE — 99285 EMERGENCY DEPT VISIT HI MDM: CPT

## 2022-07-16 PROCEDURE — 36415 COLL VENOUS BLD VENIPUNCTURE: CPT

## 2022-07-16 PROCEDURE — 87186 SC STD MICRODIL/AGAR DIL: CPT

## 2022-07-16 PROCEDURE — 81015 MICROSCOPIC EXAM OF URINE: CPT

## 2022-07-16 PROCEDURE — 83690 ASSAY OF LIPASE: CPT

## 2022-07-16 PROCEDURE — 74177 CT ABD & PELVIS W/CONTRAST: CPT

## 2022-07-16 PROCEDURE — 83605 ASSAY OF LACTIC ACID: CPT

## 2022-07-16 PROCEDURE — 87086 URINE CULTURE/COLONY COUNT: CPT

## 2022-07-16 PROCEDURE — 81003 URINALYSIS AUTO W/O SCOPE: CPT

## 2022-07-16 PROCEDURE — 87040 BLOOD CULTURE FOR BACTERIA: CPT

## 2022-07-16 PROCEDURE — 6360000002 HC RX W HCPCS: Performed by: PHYSICIAN ASSISTANT

## 2022-07-16 PROCEDURE — 80053 COMPREHEN METABOLIC PANEL: CPT

## 2022-07-16 PROCEDURE — 2580000003 HC RX 258: Performed by: FAMILY MEDICINE

## 2022-07-16 RX ORDER — ONDANSETRON 2 MG/ML
4 INJECTION INTRAMUSCULAR; INTRAVENOUS EVERY 6 HOURS PRN
Status: DISCONTINUED | OUTPATIENT
Start: 2022-07-16 | End: 2022-07-19 | Stop reason: HOSPADM

## 2022-07-16 RX ORDER — 0.9 % SODIUM CHLORIDE 0.9 %
1000 INTRAVENOUS SOLUTION INTRAVENOUS ONCE
Status: DISCONTINUED | OUTPATIENT
Start: 2022-07-16 | End: 2022-07-19 | Stop reason: HOSPADM

## 2022-07-16 RX ORDER — ACETAMINOPHEN 650 MG/1
650 SUPPOSITORY RECTAL EVERY 6 HOURS PRN
Status: DISCONTINUED | OUTPATIENT
Start: 2022-07-16 | End: 2022-07-19 | Stop reason: HOSPADM

## 2022-07-16 RX ORDER — ENOXAPARIN SODIUM 100 MG/ML
40 INJECTION SUBCUTANEOUS DAILY
Status: DISCONTINUED | OUTPATIENT
Start: 2022-07-16 | End: 2022-07-19 | Stop reason: HOSPADM

## 2022-07-16 RX ORDER — SODIUM CHLORIDE 0.9 % (FLUSH) 0.9 %
5-40 SYRINGE (ML) INJECTION PRN
Status: DISCONTINUED | OUTPATIENT
Start: 2022-07-16 | End: 2022-07-19 | Stop reason: HOSPADM

## 2022-07-16 RX ORDER — SODIUM CHLORIDE 9 MG/ML
INJECTION, SOLUTION INTRAVENOUS CONTINUOUS
Status: DISCONTINUED | OUTPATIENT
Start: 2022-07-16 | End: 2022-07-19 | Stop reason: HOSPADM

## 2022-07-16 RX ORDER — ACETAMINOPHEN 325 MG/1
650 TABLET ORAL EVERY 6 HOURS PRN
Status: DISCONTINUED | OUTPATIENT
Start: 2022-07-16 | End: 2022-07-19 | Stop reason: HOSPADM

## 2022-07-16 RX ORDER — POLYETHYLENE GLYCOL 3350 17 G/17G
17 POWDER, FOR SOLUTION ORAL DAILY PRN
Status: DISCONTINUED | OUTPATIENT
Start: 2022-07-16 | End: 2022-07-19 | Stop reason: HOSPADM

## 2022-07-16 RX ORDER — LEVONORGESTREL 52 MG/1
1 INTRAUTERINE DEVICE INTRAUTERINE ONCE
Status: ON HOLD | COMMUNITY
End: 2022-07-19 | Stop reason: HOSPADM

## 2022-07-16 RX ORDER — SODIUM CHLORIDE 0.9 % (FLUSH) 0.9 %
5-40 SYRINGE (ML) INJECTION EVERY 12 HOURS SCHEDULED
Status: DISCONTINUED | OUTPATIENT
Start: 2022-07-16 | End: 2022-07-19 | Stop reason: HOSPADM

## 2022-07-16 RX ORDER — DEXTROAMPHETAMINE SACCHARATE, AMPHETAMINE ASPARTATE MONOHYDRATE, DEXTROAMPHETAMINE SULFATE AND AMPHETAMINE SULFATE 7.5; 7.5; 7.5; 7.5 MG/1; MG/1; MG/1; MG/1
30 CAPSULE, EXTENDED RELEASE ORAL EVERY MORNING
COMMUNITY
Start: 2022-05-20

## 2022-07-16 RX ORDER — 0.9 % SODIUM CHLORIDE 0.9 %
1000 INTRAVENOUS SOLUTION INTRAVENOUS
Status: COMPLETED | OUTPATIENT
Start: 2022-07-16 | End: 2022-07-16

## 2022-07-16 RX ORDER — HYDROCODONE BITARTRATE AND ACETAMINOPHEN 5; 325 MG/1; MG/1
1 TABLET ORAL EVERY 6 HOURS PRN
Status: DISCONTINUED | OUTPATIENT
Start: 2022-07-16 | End: 2022-07-19 | Stop reason: HOSPADM

## 2022-07-16 RX ORDER — SODIUM CHLORIDE 9 MG/ML
INJECTION, SOLUTION INTRAVENOUS PRN
Status: DISCONTINUED | OUTPATIENT
Start: 2022-07-16 | End: 2022-07-19 | Stop reason: HOSPADM

## 2022-07-16 RX ORDER — ONDANSETRON 4 MG/1
4 TABLET, ORALLY DISINTEGRATING ORAL EVERY 8 HOURS PRN
Status: DISCONTINUED | OUTPATIENT
Start: 2022-07-16 | End: 2022-07-19 | Stop reason: HOSPADM

## 2022-07-16 RX ADMIN — SODIUM CHLORIDE, PRESERVATIVE FREE 10 ML: 5 INJECTION INTRAVENOUS at 22:10

## 2022-07-16 RX ADMIN — CEFTRIAXONE 1000 MG: 1 INJECTION, POWDER, FOR SOLUTION INTRAMUSCULAR; INTRAVENOUS at 15:13

## 2022-07-16 RX ADMIN — SODIUM CHLORIDE 1000 ML: 9 INJECTION, SOLUTION INTRAVENOUS at 14:36

## 2022-07-16 RX ADMIN — HYDROCODONE BITARTRATE AND ACETAMINOPHEN 1 TABLET: 5; 325 TABLET ORAL at 20:00

## 2022-07-16 RX ADMIN — SODIUM CHLORIDE: 9 INJECTION, SOLUTION INTRAVENOUS at 18:25

## 2022-07-16 RX ADMIN — IOPAMIDOL 100 ML: 755 INJECTION, SOLUTION INTRAVENOUS at 14:08

## 2022-07-16 RX ADMIN — ACETAMINOPHEN 650 MG: 325 TABLET ORAL at 18:20

## 2022-07-16 ASSESSMENT — PAIN DESCRIPTION - LOCATION
LOCATION: BACK
LOCATION: FLANK

## 2022-07-16 ASSESSMENT — PAIN DESCRIPTION - DESCRIPTORS
DESCRIPTORS: ACHING
DESCRIPTORS: ACHING;SORE

## 2022-07-16 ASSESSMENT — ENCOUNTER SYMPTOMS
VOMITING: 0
BACK PAIN: 1
NAUSEA: 1
SHORTNESS OF BREATH: 0
ABDOMINAL PAIN: 0
COUGH: 0

## 2022-07-16 ASSESSMENT — PAIN - FUNCTIONAL ASSESSMENT: PAIN_FUNCTIONAL_ASSESSMENT: 0-10

## 2022-07-16 ASSESSMENT — PAIN SCALES - GENERAL
PAINLEVEL_OUTOF10: 0
PAINLEVEL_OUTOF10: 3
PAINLEVEL_OUTOF10: 5
PAINLEVEL_OUTOF10: 6

## 2022-07-16 ASSESSMENT — PAIN DESCRIPTION - ORIENTATION: ORIENTATION: RIGHT

## 2022-07-16 NOTE — ED PROVIDER NOTES
Ref Range           WBC                         27.6 (H)          4.3 - 11.1 K*       RBC                         4.27              4.05 - 5.2 M*       Hemoglobin                  13.1              11.7 - 15.4 *       Hematocrit                  40.0              35.8 - 46.3 %       MCV                         93.7              79.6 - 97.8 *       MCH                         30.7              26.1 - 32.9 *       MCHC                        32.8              31.4 - 35.0 *       RDW                         13.2              11.9 - 14.6 %       Platelets                   239               150 - 450 K/*       MPV                         8.7 (L)           9.4 - 12.3 FL       nRBC                        0.00              0.0 - 0.2 K/*       Differential Type           AUTOMATED                             Seg Neutrophils             91 (H)            43 - 78 %           Lymphocytes                 4 (L)             13 - 44 %           Monocytes                   4                 4.0 - 12.0 %        Eosinophils %               0 (L)             0.5 - 7.8 %         Basophils                   0                 0.0 - 2.0 %         Immature Granulocytes       1                 0.0 - 5.0 %         Segs Absolute               25.1 (H)          1.7 - 8.2 K/*       Absolute Lymph #            1.2               0.5 - 4.6 K/*       Absolute Mono #             1.0               0.1 - 1.3 K/*       Absolute Eos #              0.0               0.0 - 0.8 K/*       Basophils Absolute          0.1               0.0 - 0.2 K/*       Absolute Immature Gran*     0.2               0.0 - 0.5 K/*  -CMP:   Collection Time: 07/16/22 12:19 PM       Result                      Value             Ref Range           Sodium                      134 (L)           136 - 145 mm*       Potassium                   4.3               3.5 - 5.1 mm*       Chloride                    101               98 - 107 mmo*       CO2                         27 21 - 32 mmol*       Anion Gap                   6 (L)             7 - 16 mmol/L       Glucose                     108 (H)           65 - 100 mg/*       BUN                         17                6 - 23 MG/DL        CREATININE                  1.70 (H)          0.6 - 1.0 MG*       GFR         46 (L)            >60 ml/min/1*       GFR Non- Americ*     38 (L)            >60 ml/min/1*       Calcium                     8.9               8.3 - 10.4 M*       Total Bilirubin             0.9               0.2 - 1.1 MG*       ALT                         43                12 - 65 U/L         AST                         43 (H)            15 - 37 U/L         Alk Phosphatase             194 (H)           50 - 136 U/L        Total Protein               7.4               6.3 - 8.2 g/*       Albumin                     2.7 (L)           3.5 - 5.0 g/*       Globulin                    4.7 (H)           2.3 - 3.5 g/*       Albumin/Globulin Ratio      0.6 (L)           1.2 - 3.5      -Lipase:   Collection Time: 07/16/22 12:19 PM       Result                      Value             Ref Range           Lipase                      33 (L)            73 - 393 U/L   -Procalcitonin:   Collection Time: 07/16/22 12:19 PM       Result                      Value             Ref Range           Procalcitonin               2.92 (H)          0.00 - 0.49 *  -Lactic Acid:   Collection Time: 07/16/22  2:31 PM       Result                      Value             Ref Range           Lactic Acid, Plasma         1.5               0.4 - 2.0 MM*  -POCT Urinalysis no Micro:   Collection Time: 07/16/22  2:39 PM       Result                      Value             Ref Range           Specific Gravity, Urin*     <=1.005           1.001 - 1.02*       pH, Urine, POC              6.0               5.0 - 9.0           Protein, Urine, POC         100 (A)           NEG mg/dL           Glucose, UA POC             Negative          NEG mg/dL           KETONES, Urine, POC         Negative          NEG mg/dL           Bilirubin, Urine, POC       Negative          NEG                 Blood, UA POC               MODERATE (A)      NEG                 URINE UROBILINOGEN POC      1.0               0.2 - 1.0 EU*       Nitrate, Urine, POC         Negative          NEG                 Leukocyte Est, UA POC       LARGE (A)         NEG                 Performed by:                                                 Adolfo Singh      Patient Progress  Patient progress: stable       Orders Placed This Encounter   Procedures    Culture, Urine    CT ABDOMEN PELVIS W IV CONTRAST Additional Contrast? None    CBC with Auto Differential    CMP    Lipase    Diet NPO    POCT Urine Dipstick    POC Pregnancy Urine Qual    Saline lock IV        Summer Renny is a 31 y.o. female who presents to the Emergency Department with chief complaint of    Chief Complaint   Patient presents with    Flank Pain      Patient is a 76-year-old female who reports she was having UTI-like symptoms a week and a half ago. She states she was started on Macrobid to cover the urine. No culture was obtained that she knows about. She states she finished those antibiotics on Wednesday. She states that day she seemed to have some chills but did not check a temperature. She states yesterday was her first noted temperature of 101 °F.  She states she started to hurt on the right flank of her back. She states that in today its been moving down the back as well and is quite uncomfortable. She states she is nauseous but has not thrown up. No change with eating or drinking. No constipation or diarrhea. She states her urine is still an abnormal color but denies any burning or noted blood in the urine. She states she has been taking Motrin for fever control. Denies any other important medical history. The history is provided by the patient.        Review of Systems   Constitutional: Positive for fever. Negative for chills. Respiratory:  Negative for cough and shortness of breath. Cardiovascular:  Negative for chest pain. Gastrointestinal:  Positive for nausea. Negative for abdominal pain and vomiting. Genitourinary:  Positive for flank pain. Negative for dysuria, frequency, hematuria and urgency. Musculoskeletal:  Positive for back pain. Negative for gait problem. Skin:  Negative for rash. Neurological:  Negative for dizziness, syncope and headaches. Psychiatric/Behavioral:  Negative for agitation and behavioral problems. All other systems reviewed and are negative. No past medical history on file. No past surgical history on file. No family history on file. Social Connections: Not on file        Allergies   Allergen Reactions    Keflex [Cephalexin] Other (See Comments)    Macrolides And Ketolides Other (See Comments)    Pcn [Penicillins] Other (See Comments)    Sulfa Antibiotics Other (See Comments)        Vitals signs and nursing note reviewed. Patient Vitals for the past 4 hrs:   Temp Pulse Resp BP SpO2   07/16/22 1212 98.6 °F (37 °C) (!) 114 18 118/75 98 %          Physical Exam  Vitals and nursing note reviewed. Constitutional:       General: She is not in acute distress. Appearance: Normal appearance. She is not ill-appearing. HENT:      Head: Normocephalic and atraumatic. Right Ear: External ear normal.      Left Ear: External ear normal.   Eyes:      Extraocular Movements: Extraocular movements intact. Conjunctiva/sclera: Conjunctivae normal.   Cardiovascular:      Rate and Rhythm: Normal rate and regular rhythm. Pulses: Normal pulses. Heart sounds: Normal heart sounds. Pulmonary:      Effort: Pulmonary effort is normal.      Breath sounds: Normal breath sounds. Abdominal:      General: Abdomen is flat. Bowel sounds are normal. There is no distension. Palpations: Abdomen is soft. Tenderness:  There is no abdominal tenderness. There is right CVA tenderness. There is no guarding or rebound. Musculoskeletal:         General: Tenderness (right low back) present. No swelling. Normal range of motion. Cervical back: Normal range of motion. Skin:     General: Skin is warm and dry. Capillary Refill: Capillary refill takes less than 2 seconds. Neurological:      General: No focal deficit present. Mental Status: She is alert and oriented to person, place, and time. Psychiatric:         Mood and Affect: Mood normal.         Behavior: Behavior normal.        Procedures      Labs Reviewed   CBC WITH AUTO DIFFERENTIAL - Abnormal; Notable for the following components:       Result Value    WBC 27.6 (*)     MPV 8.7 (*)     Seg Neutrophils 91 (*)     Lymphocytes 4 (*)     Eosinophils % 0 (*)     Segs Absolute 25.1 (*)     All other components within normal limits   COMPREHENSIVE METABOLIC PANEL - Abnormal; Notable for the following components:    Sodium 134 (*)     Anion Gap 6 (*)     Glucose 108 (*)     CREATININE 1.70 (*)     GFR  46 (*)     GFR Non- 38 (*)     AST 43 (*)     Alk Phosphatase 194 (*)     Albumin 2.7 (*)     Globulin 4.7 (*)     Albumin/Globulin Ratio 0.6 (*)     All other components within normal limits   LIPASE - Abnormal; Notable for the following components:    Lipase 33 (*)     All other components within normal limits   CULTURE, URINE   POC PREGNANCY UR-QUAL        CT ABDOMEN PELVIS W IV CONTRAST Additional Contrast? None    (Results Pending)                          Voice dictation software was used during the making of this note. This software is not perfect and grammatical and other typographical errors may be present. This note has not been completely proofread for errors.      Doron Dimas PA-C  07/16/22 5354

## 2022-07-16 NOTE — PROGRESS NOTES
TRANSFER - IN REPORT:    Verbal report received from [facility] on Marlow Sink  being received from ED for routine progression of patient care      Report consisted of patient's Situation, Background, Assessment and   Recommendations(SBAR). Information from the following report(s) Nurse Handoff Report, Index, ED Encounter Summary, ED SBAR, Intake/Output, MAR, and Recent Results was reviewed with the receiving nurse. Opportunity for questions and clarification was provided. Assessment completed upon patient's arrival to unit and care assumed.

## 2022-07-16 NOTE — ED TRIAGE NOTES
Patient ambulatory to triage with mask in place. Patient reports she was recently dx with UTI and took full course of ABX. Pt reports chills, lower back pain, abd pain, weakness and fever.

## 2022-07-16 NOTE — H&P
Hospitalist Admission History and Physical         NAME:            Jordana Jones    Age:                31 y.o.    :               1995    MRN:              [id number]    PCP: Kacie South MD    Consulting MD:    Treatment Team: Attending Provider: Ml Ching DO; Physician Assistant: [employer], ROGER; Registered Nurse: Gisella Hanna RN         Chief Complaint   Patient presents with    Flank Pain   HPI:    Patient is a 31 y.o. female who presented to the ED for cc right CVA back pain, fevers, and chills despite just finishing macrobid for a UTI Wednesday. She states she was on Macrobid for at least 7 days. Nothing seems to make better or worse. Hx of ADD but has not been on her medications for the past week. Surgical hx - C section  Family hx - maternal grandfather with lung cancer. Past hx - ADD    Vitals -     Labs - Creatine 1.7 (creatine 0.5 in 2018), procal 2.9, WBC 27.6. CT abdomen pelvis with contrast -   Abnormal appearance of the right kidney with findings most in   keeping with  pyelonephritis.      Social History     Social History Narrative    Not on file            Social History     Tobacco Use    Smoking status: Not on file    Smokeless tobacco: Not on file   Substance Use Topics    Alcohol use: Not on file            Social History     Substance and Sexual Activity   Drug Use Not on file                 Allergies   Allergen Reactions    Keflex [Cephalexin] Other (See Comments)    Macrolides And Ketolides Other (See Comments)    Pcn [Penicillins] Other (See Comments)    Sulfa Antibiotics Other (See Comments)            Prior to Admission medications    Not on File                      Review of Systems         Constitutional: NAD    Eyes:  no change in visual acuity, no photophobia    Ears, nose, mouth, throat, and face: no  Odynphagia, dysphagia, no thrush or exudate, negative for chronic sinus congestion, recurrent headaches    Respiratory: negative for SOB, hemoptysis or cough    Cardiovascular: negative for CP, palpitations, or PND    Gastrointestinal: negative for abdominal pain, no hematemesis, hematochezia or BRBPR    Genitourinary: no urgency, frequency, or dysuria, no nocturia    Integument/breast: negative for skin rash or skin lesions    Hematologic/lymphatic: negative for known bleeding disorder    Musculoskeletal:Right CVA tenderness    Neurological: fevers, chills    Behavioral/Psych: negative for depression or chronic anxiety,    Endocrine: negative for polydyspia, polyuria or intolerance to heat or cold    Allergic/Immunologic: negative for chronic allergic rhinitis, or known connective tissue disorder      Objective:    Patient Vitals for the past 24 hrs:   Temp Pulse Resp BP SpO2   07/16/22 1437 -- 99 -- -- 100 %   07/16/22 1436 -- -- -- 99/73 --   07/16/22 1212 98.6 °F (37 °C) (!) 114 18 118/75 98 %            No intake/output data recorded. No intake/output data recorded.          Data Review:   Recent Results (from the past 24 hour(s))   CBC with Auto Differential    Collection Time: 07/16/22 12:19 PM   Result Value Ref Range    WBC 27.6 (H) 4.3 - 11.1 K/uL    RBC 4.27 4.05 - 5.2 M/uL    Hemoglobin 13.1 11.7 - 15.4 g/dL    Hematocrit 40.0 35.8 - 46.3 %    MCV 93.7 79.6 - 97.8 FL    MCH 30.7 26.1 - 32.9 PG    MCHC 32.8 31.4 - 35.0 g/dL    RDW 13.2 11.9 - 14.6 %    Platelets 269 510 - 107 K/uL    MPV 8.7 (L) 9.4 - 12.3 FL    nRBC 0.00 0.0 - 0.2 K/uL    Differential Type AUTOMATED      Seg Neutrophils 91 (H) 43 - 78 %    Lymphocytes 4 (L) 13 - 44 %    Monocytes 4 4.0 - 12.0 %    Eosinophils % 0 (L) 0.5 - 7.8 %    Basophils 0 0.0 - 2.0 %    Immature Granulocytes 1 0.0 - 5.0 %    Segs Absolute 25.1 (H) 1.7 - 8.2 K/UL    Absolute Lymph # 1.2 0.5 - 4.6 K/UL    Absolute Mono # 1.0 0.1 - 1.3 K/UL    Absolute Eos # 0.0 0.0 - 0.8 K/UL    Basophils Absolute 0.1 0.0 - 0.2 K/UL    Absolute Immature Granulocyte 0.2 0.0 - 0.5 K/UL   CMP    Collection Time: 07/16/22 12:19 PM   Result Value Ref Range    Sodium 134 (L) 136 - 145 mmol/L    Potassium 4.3 3.5 - 5.1 mmol/L    Chloride 101 98 - 107 mmol/L    CO2 27 21 - 32 mmol/L    Anion Gap 6 (L) 7 - 16 mmol/L    Glucose 108 (H) 65 - 100 mg/dL    BUN 17 6 - 23 MG/DL    CREATININE 1.70 (H) 0.6 - 1.0 MG/DL    GFR  46 (L) >60 ml/min/1.73m2    GFR Non- 38 (L) >60 ml/min/1.73m2    Calcium 8.9 8.3 - 10.4 MG/DL    Total Bilirubin 0.9 0.2 - 1.1 MG/DL    ALT 43 12 - 65 U/L    AST 43 (H) 15 - 37 U/L    Alk Phosphatase 194 (H) 50 - 136 U/L    Total Protein 7.4 6.3 - 8.2 g/dL    Albumin 2.7 (L) 3.5 - 5.0 g/dL    Globulin 4.7 (H) 2.3 - 3.5 g/dL    Albumin/Globulin Ratio 0.6 (L) 1.2 - 3.5     Lipase    Collection Time: 07/16/22 12:19 PM   Result Value Ref Range    Lipase 33 (L) 73 - 393 U/L   Procalcitonin    Collection Time: 07/16/22 12:19 PM   Result Value Ref Range    Procalcitonin 2.92 (H) 0.00 - 0.49 ng/mL   Lactic Acid    Collection Time: 07/16/22  2:31 PM   Result Value Ref Range    Lactic Acid, Plasma 1.5 0.4 - 2.0 MMOL/L   POCT Urinalysis no Micro    Collection Time: 07/16/22  2:39 PM   Result Value Ref Range    Specific Gravity, Urine, POC <=1.005 1.001 - 1.023      pH, Urine, POC 6.0 5.0 - 9.0      Protein, Urine,  (A) NEG mg/dL    Glucose, UA POC Negative NEG mg/dL    KETONES, Urine, POC Negative NEG mg/dL    Bilirubin, Urine, POC Negative NEG      Blood, UA POC MODERATE (A) NEG      URINE UROBILINOGEN POC 1.0 0.2 - 1.0 EU/dL    Nitrate, Urine, POC Negative NEG      Leukocyte Est, UA POC LARGE (A) NEG      Performed by: Breana Bradley             Physical Exam:    General:    Alert, cooperative, no distress, appears stated age. Eyes:    Conjunctivae/corneas clear. PERRL    Ears:    Normal     Nose:    Wearing mask    Mouth/Throat:    Wearing mask    Neck:    no JVD.     Back:     Right CVA tenderness, no ecchymosis     Lungs:     Clear to auscultation bilaterally. Heart:    Regular rate and rhythm, S1, S2 normal    Abdomen:     Soft, non-tender. Bowel sounds normal. No masses,  No organomegaly. Extremities:    Extremities normal, atraumatic, no cyanosis or edema. Skin:    Skin color, texture, turgor normal. No rashes or lesions    Neurologic:    CNII-XII intact. Normal strength, sensation and reflexes throughout. Assessment and Plan         Principal Problem:    Severe sepsis ([address]  Active Problems:    SYEDA (acute kidney injury) ([address]    ADD (attention deficit disorder)    Pyelonephritis  Resolved Problems:    * No resolved hospital problems. *    Severe sepsis met by HR, WBC, SYEDA - secondary to pyelonephrosis. Ceftriaxone started. Urine culture and blood cultures pending. SYEDA - Sepsis bolus. IV fluids and tx underlying infection. ADD - holding home meds for now.      DVT prophylaxis - Lovenox        Signed By:    Nav Diaz DO    July 16, 2022

## 2022-07-16 NOTE — ED NOTES
TRANSFER - OUT REPORT:    Verbal report given to Vadmi Smith on Denisse Russell  being transferred to [address] [place] [address] for routine progression of patient care       Report consisted of patient's Situation, Background, Assessment and   Recommendations(SBAR). Information from the following report(s) Nurse Handoff Report, ED SBAR, [facility], and Recent Results was reviewed with the receiving nurse. Lines:   Peripheral IV 07/16/22 Right Antecubital (Active)        Opportunity for questions and clarification was provided.       Patient transported with:  Renny Awan RN  07/16/22 7007

## 2022-07-16 NOTE — PROGRESS NOTES
Patient is being admitted to floor from ER    Lives in [place] [address]    Works for [employer]    Full code    No directives on file    Exp d/c  2  days    Hx - ADD

## 2022-07-17 LAB
ALBUMIN SERPL-MCNC: 2.1 G/DL (ref 3.5–5)
ALBUMIN/GLOB SERPL: 0.5 {RATIO} (ref 1.2–3.5)
ALP SERPL-CCNC: 176 U/L (ref 50–136)
ALT SERPL-CCNC: 28 U/L (ref 12–65)
ANION GAP SERPL CALC-SCNC: 6 MMOL/L (ref 7–16)
AST SERPL-CCNC: 14 U/L (ref 15–37)
BASOPHILS # BLD: 0.1 K/UL (ref 0–0.2)
BASOPHILS NFR BLD: 0 % (ref 0–2)
BILIRUB SERPL-MCNC: 0.6 MG/DL (ref 0.2–1.1)
BUN SERPL-MCNC: 11 MG/DL (ref 6–23)
CALCIUM SERPL-MCNC: 8.3 MG/DL (ref 8.3–10.4)
CHLORIDE SERPL-SCNC: 108 MMOL/L (ref 98–107)
CO2 SERPL-SCNC: 24 MMOL/L (ref 21–32)
CREAT SERPL-MCNC: 1 MG/DL (ref 0.6–1)
DIFFERENTIAL METHOD BLD: ABNORMAL
EOSINOPHIL # BLD: 0 K/UL (ref 0–0.8)
EOSINOPHIL NFR BLD: 0 % (ref 0.5–7.8)
ERYTHROCYTE [DISTWIDTH] IN BLOOD BY AUTOMATED COUNT: 13.3 % (ref 11.9–14.6)
GLOBULIN SER CALC-MCNC: 4.1 G/DL (ref 2.3–3.5)
GLUCOSE SERPL-MCNC: 106 MG/DL (ref 65–100)
HCG UR QL: NEGATIVE
HCT VFR BLD AUTO: 35.2 % (ref 35.8–46.3)
HGB BLD-MCNC: 11.4 G/DL (ref 11.7–15.4)
IMM GRANULOCYTES # BLD AUTO: 0.1 K/UL (ref 0–0.5)
IMM GRANULOCYTES NFR BLD AUTO: 1 % (ref 0–5)
LYMPHOCYTES # BLD: 1.4 K/UL (ref 0.5–4.6)
LYMPHOCYTES NFR BLD: 6 % (ref 13–44)
MCH RBC QN AUTO: 30.1 PG (ref 26.1–32.9)
MCHC RBC AUTO-ENTMCNC: 32.4 G/DL (ref 31.4–35)
MCV RBC AUTO: 92.9 FL (ref 79.6–97.8)
MONOCYTES # BLD: 1.8 K/UL (ref 0.1–1.3)
MONOCYTES NFR BLD: 8 % (ref 4–12)
NEUTS SEG # BLD: 19 K/UL (ref 1.7–8.2)
NEUTS SEG NFR BLD: 85 % (ref 43–78)
NRBC # BLD: 0 K/UL (ref 0–0.2)
PLATELET # BLD AUTO: 228 K/UL (ref 150–450)
PMV BLD AUTO: 8.9 FL (ref 9.4–12.3)
POTASSIUM SERPL-SCNC: 3.6 MMOL/L (ref 3.5–5.1)
PROT SERPL-MCNC: 6.2 G/DL (ref 6.3–8.2)
RBC # BLD AUTO: 3.79 M/UL (ref 4.05–5.2)
SODIUM SERPL-SCNC: 138 MMOL/L (ref 136–145)
WBC # BLD AUTO: 22.4 K/UL (ref 4.3–11.1)

## 2022-07-17 PROCEDURE — 81025 URINE PREGNANCY TEST: CPT

## 2022-07-17 PROCEDURE — 2580000003 HC RX 258: Performed by: FAMILY MEDICINE

## 2022-07-17 PROCEDURE — 6370000000 HC RX 637 (ALT 250 FOR IP): Performed by: FAMILY MEDICINE

## 2022-07-17 PROCEDURE — 6360000002 HC RX W HCPCS: Performed by: FAMILY MEDICINE

## 2022-07-17 PROCEDURE — 36415 COLL VENOUS BLD VENIPUNCTURE: CPT

## 2022-07-17 PROCEDURE — 1100000000 HC RM PRIVATE

## 2022-07-17 PROCEDURE — 80053 COMPREHEN METABOLIC PANEL: CPT

## 2022-07-17 PROCEDURE — 85025 COMPLETE CBC W/AUTO DIFF WBC: CPT

## 2022-07-17 RX ADMIN — ONDANSETRON 4 MG: 4 TABLET, ORALLY DISINTEGRATING ORAL at 15:45

## 2022-07-17 RX ADMIN — SODIUM CHLORIDE, PRESERVATIVE FREE 10 ML: 5 INJECTION INTRAVENOUS at 09:49

## 2022-07-17 RX ADMIN — HYDROCODONE BITARTRATE AND ACETAMINOPHEN 1 TABLET: 5; 325 TABLET ORAL at 15:45

## 2022-07-17 RX ADMIN — CEFTRIAXONE 1000 MG: 1 INJECTION, POWDER, FOR SOLUTION INTRAMUSCULAR; INTRAVENOUS at 15:35

## 2022-07-17 RX ADMIN — ACETAMINOPHEN 650 MG: 325 TABLET ORAL at 23:27

## 2022-07-17 RX ADMIN — HYDROCODONE BITARTRATE AND ACETAMINOPHEN 1 TABLET: 5; 325 TABLET ORAL at 07:32

## 2022-07-17 RX ADMIN — SODIUM CHLORIDE: 9 INJECTION, SOLUTION INTRAVENOUS at 09:49

## 2022-07-17 RX ADMIN — ACETAMINOPHEN 650 MG: 325 TABLET ORAL at 15:35

## 2022-07-17 RX ADMIN — ACETAMINOPHEN 650 MG: 325 TABLET ORAL at 04:08

## 2022-07-17 RX ADMIN — SODIUM CHLORIDE, PRESERVATIVE FREE 10 ML: 5 INJECTION INTRAVENOUS at 21:00

## 2022-07-17 ASSESSMENT — PAIN DESCRIPTION - LOCATION
LOCATION: FLANK
LOCATION: ABDOMEN;FLANK
LOCATION: FLANK
LOCATION: ABDOMEN
LOCATION: GENERALIZED

## 2022-07-17 ASSESSMENT — PAIN DESCRIPTION - ONSET
ONSET: ON-GOING
ONSET: ON-GOING

## 2022-07-17 ASSESSMENT — PAIN DESCRIPTION - ORIENTATION
ORIENTATION: RIGHT

## 2022-07-17 ASSESSMENT — PAIN - FUNCTIONAL ASSESSMENT
PAIN_FUNCTIONAL_ASSESSMENT: ACTIVITIES ARE NOT PREVENTED
PAIN_FUNCTIONAL_ASSESSMENT: ACTIVITIES ARE NOT PREVENTED

## 2022-07-17 ASSESSMENT — PAIN SCALES - GENERAL
PAINLEVEL_OUTOF10: 0
PAINLEVEL_OUTOF10: 0
PAINLEVEL_OUTOF10: 6
PAINLEVEL_OUTOF10: 6
PAINLEVEL_OUTOF10: 0
PAINLEVEL_OUTOF10: 3
PAINLEVEL_OUTOF10: 6
PAINLEVEL_OUTOF10: 3
PAINLEVEL_OUTOF10: 3

## 2022-07-17 ASSESSMENT — PAIN DESCRIPTION - PAIN TYPE
TYPE: ACUTE PAIN
TYPE: ACUTE PAIN

## 2022-07-17 ASSESSMENT — PAIN DESCRIPTION - DESCRIPTORS
DESCRIPTORS: ACHING

## 2022-07-17 ASSESSMENT — PAIN DESCRIPTION - FREQUENCY
FREQUENCY: INTERMITTENT
FREQUENCY: INTERMITTENT

## 2022-07-17 ASSESSMENT — PAIN DESCRIPTION - DIRECTION: RADIATING_TOWARDS: SHOULDERS

## 2022-07-17 NOTE — PLAN OF CARE
Problem: Discharge Planning  Goal: Discharge to home or other facility with appropriate resources  7/17/2022 1926 by Shan Babcock RN  Outcome: Progressing  7/17/2022 1511 by Fadia Parham RN  Outcome: Progressing  Flowsheets (Taken 7/17/2022 0720)  Discharge to home or other facility with appropriate resources:   Identify barriers to discharge with patient and caregiver   Arrange for needed discharge resources and transportation as appropriate   Identify discharge learning needs (meds, wound care, etc)     Problem: Pain  Goal: Verbalizes/displays adequate comfort level or baseline comfort level  7/17/2022 1926 by Shan Babcock RN  Outcome: Progressing  7/17/2022 1511 by Fadia Parham RN  Outcome: Progressing  Flowsheets (Taken 7/17/2022 0720)  Verbalizes/displays adequate comfort level or baseline comfort level:   Encourage patient to monitor pain and request assistance   Assess pain using appropriate pain scale   Administer analgesics based on type and severity of pain and evaluate response   Implement non-pharmacological measures as appropriate and evaluate response

## 2022-07-17 NOTE — PROGRESS NOTES
07/16/22 1630 -- 94 -- -- --   07/16/22 1618 -- -- -- 109/79 100 %   07/16/22 1603 -- -- -- (!) 123/92 97 %   07/16/22 1548 -- -- -- 115/82 99 %   07/16/22 1533 -- -- -- 99/66 100 %   07/16/22 1518 -- -- -- 122/84 100 %   07/16/22 1503 -- -- -- 107/66 100 %   07/16/22 1448 -- -- -- 105/76 100 %   07/16/22 1437 -- 99 -- -- 100 %   07/16/22 1436 -- -- -- 99/73 --       Estimated body mass index is 26.52 kg/m² as calculated from the following:    Height as of this encounter: 5' 2\" (1.575 m). Weight as of this encounter: 145 lb (65.8 kg). Intake/Output Summary (Last 24 hours) at 7/17/2022 1252  Last data filed at 7/17/2022 0020  Gross per 24 hour   Intake 438 ml   Output --   Net 438 ml         Physical Exam:     Blood pressure 112/70, pulse 92, temperature 99.2 °F (37.3 °C), temperature source Oral, resp. rate 16, height 5' 2\" (1.575 m), weight 145 lb (65.8 kg), SpO2 93 %. General:    Well nourished. No overt distress  Head:  Normocephalic, atraumatic  Eyes:  Sclerae appear normal.  Pupils equally round. ENT:  Nares appear normal, no drainage. Moist oral mucosa  Neck:  No restricted ROM. Trachea midline   CV:   RRR. No m/r/g. No jugular venous distension. Lungs:   CTAB. No wheezing, rhonchi, or rales. Respirations even, unlabored  Abdomen: Bowel sounds present. Soft, nontender, nondistended. Extremities: No cyanosis or clubbing. No edema  Skin:     No rashes and normal coloration. Warm and dry. Neuro:  CN II-XII grossly intact. A&Ox3  Psych:  Normal mood and affect.       I have reviewed ordered lab tests and independently visualized imaging below:    Recent Labs:  Recent Results (from the past 48 hour(s))   CBC with Auto Differential    Collection Time: 07/16/22 12:19 PM   Result Value Ref Range    WBC 27.6 (H) 4.3 - 11.1 K/uL    RBC 4.27 4.05 - 5.2 M/uL    Hemoglobin 13.1 11.7 - 15.4 g/dL    Hematocrit 40.0 35.8 - 46.3 %    MCV 93.7 79.6 - 97.8 FL    MCH 30.7 26.1 - 32.9 PG    MCHC 32.8 Value Ref Range    Specific Gravity, Urine, POC <=1.005 1.001 - 1.023      pH, Urine, POC 6.0 5.0 - 9.0      Protein, Urine,  (A) NEG mg/dL    Glucose, UA POC Negative NEG mg/dL    KETONES, Urine, POC Negative NEG mg/dL    Bilirubin, Urine, POC Negative NEG      Blood, UA POC MODERATE (A) NEG      URINE UROBILINOGEN POC 1.0 0.2 - 1.0 EU/dL    Nitrate, Urine, POC Negative NEG      Leukocyte Est, UA POC LARGE (A) NEG      Performed by: Daja Cheek    Culture, Urine    Collection Time: 07/16/22  3:05 PM    Specimen: URINE-CLEAN CATCH    URINE   Result Value Ref Range    Special Requests NO SPECIAL REQUESTS      Culture        No growth after short period of incubation. Further results to follow after overnight incubation.    Urinalysis, Micro    Collection Time: 07/16/22  3:15 PM   Result Value Ref Range    WBC, UA >100 0 /hpf    RBC, UA 0 0 /hpf    Epithelial Cells UA 20-50 0 /hpf    BACTERIA, URINE 3+ (H) 0 /hpf    Casts 0 0 /lpf    Crystals 0 0 /LPF    Mucus, UA 0 0 /lpf    OTHER OBSERVATIONS RESULTS VERIFIED MANUALLY     Blood Culture 2    Collection Time: 07/16/22  7:12 PM    Specimen: Blood   Result Value Ref Range    Special Requests RIGHT  FOREARM        Culture NO GROWTH AFTER 13 HOURS     CBC with Auto Differential    Collection Time: 07/17/22  6:12 AM   Result Value Ref Range    WBC 22.4 (H) 4.3 - 11.1 K/uL    RBC 3.79 (L) 4.05 - 5.2 M/uL    Hemoglobin 11.4 (L) 11.7 - 15.4 g/dL    Hematocrit 35.2 (L) 35.8 - 46.3 %    MCV 92.9 79.6 - 97.8 FL    MCH 30.1 26.1 - 32.9 PG    MCHC 32.4 31.4 - 35.0 g/dL    RDW 13.3 11.9 - 14.6 %    Platelets 045 880 - 775 K/uL    MPV 8.9 (L) 9.4 - 12.3 FL    nRBC 0.00 0.0 - 0.2 K/uL    Differential Type AUTOMATED      Seg Neutrophils 85 (H) 43 - 78 %    Lymphocytes 6 (L) 13 - 44 %    Monocytes 8 4.0 - 12.0 %    Eosinophils % 0 (L) 0.5 - 7.8 %    Basophils 0 0.0 - 2.0 %    Immature Granulocytes 1 0.0 - 5.0 %    Segs Absolute 19.0 (H) 1.7 - 8.2 K/UL    Absolute Lymph # 1.4 0.5 - 4.6 K/UL    Absolute Mono # 1.8 (H) 0.1 - 1.3 K/UL    Absolute Eos # 0.0 0.0 - 0.8 K/UL    Basophils Absolute 0.1 0.0 - 0.2 K/UL    Absolute Immature Granulocyte 0.1 0.0 - 0.5 K/UL   Comprehensive Metabolic Panel w/ Reflex to MG    Collection Time: 07/17/22  6:12 AM   Result Value Ref Range    Sodium 138 136 - 145 mmol/L    Potassium 3.6 3.5 - 5.1 mmol/L    Chloride 108 (H) 98 - 107 mmol/L    CO2 24 21 - 32 mmol/L    Anion Gap 6 (L) 7 - 16 mmol/L    Glucose 106 (H) 65 - 100 mg/dL    BUN 11 6 - 23 MG/DL    CREATININE 1.00 0.6 - 1.0 MG/DL    GFR African American >60 >60 ml/min/1.73m2    GFR Non- >60 >60 ml/min/1.73m2    Calcium 8.3 8.3 - 10.4 MG/DL    Total Bilirubin 0.6 0.2 - 1.1 MG/DL    ALT 28 12 - 65 U/L    AST 14 (L) 15 - 37 U/L    Alk Phosphatase 176 (H) 50 - 136 U/L    Total Protein 6.2 (L) 6.3 - 8.2 g/dL    Albumin 2.1 (L) 3.5 - 5.0 g/dL    Globulin 4.1 (H) 2.3 - 3.5 g/dL    Albumin/Globulin Ratio 0.5 (L) 1.2 - 3.5             Other Studies:    Current Meds:  Current Facility-Administered Medications   Medication Dose Route Frequency    0.9 % sodium chloride bolus  1,000 mL IntraVENous Once    0.9 % sodium chloride infusion   IntraVENous Continuous    sodium chloride flush 0.9 % injection 5-40 mL  5-40 mL IntraVENous 2 times per day    sodium chloride flush 0.9 % injection 5-40 mL  5-40 mL IntraVENous PRN    0.9 % sodium chloride infusion   IntraVENous PRN    enoxaparin (LOVENOX) injection 40 mg  40 mg SubCUTAneous Daily    ondansetron (ZOFRAN-ODT) disintegrating tablet 4 mg  4 mg Oral Q8H PRN    Or    ondansetron (ZOFRAN) injection 4 mg  4 mg IntraVENous Q6H PRN    polyethylene glycol (GLYCOLAX) packet 17 g  17 g Oral Daily PRN    acetaminophen (TYLENOL) tablet 650 mg  650 mg Oral Q6H PRN    Or    acetaminophen (TYLENOL) suppository 650 mg  650 mg Rectal Q6H PRN    cefTRIAXone (ROCEPHIN) 1000 mg IVPB in NS 50ml minibag  1,000 mg IntraVENous Q24H    HYDROcodone-acetaminophen (NORCO) 5-325 MG per tablet 1 tablet  1 tablet Oral Q6H PRN       Signed:  Mariaa Pena, APRN - CNP    Part of this note may have been written by using a voice dictation software. The note has been proof read but may still contain some grammatical/other typographical errors.

## 2022-07-18 ENCOUNTER — APPOINTMENT (OUTPATIENT)
Dept: GENERAL RADIOLOGY | Age: 27
DRG: 872 | End: 2022-07-18

## 2022-07-18 LAB
ANION GAP SERPL CALC-SCNC: 6 MMOL/L (ref 7–16)
BUN SERPL-MCNC: 9 MG/DL (ref 6–23)
CALCIUM SERPL-MCNC: 8.5 MG/DL (ref 8.3–10.4)
CHLORIDE SERPL-SCNC: 105 MMOL/L (ref 98–107)
CO2 SERPL-SCNC: 26 MMOL/L (ref 21–32)
CREAT SERPL-MCNC: 0.9 MG/DL (ref 0.6–1)
ERYTHROCYTE [DISTWIDTH] IN BLOOD BY AUTOMATED COUNT: 14 % (ref 11.9–14.6)
GLUCOSE SERPL-MCNC: 116 MG/DL (ref 65–100)
HCT VFR BLD AUTO: 35 % (ref 35.8–46.3)
HGB BLD-MCNC: 11.4 G/DL (ref 11.7–15.4)
MAGNESIUM SERPL-MCNC: 2 MG/DL (ref 1.8–2.4)
MCH RBC QN AUTO: 30.7 PG (ref 26.1–32.9)
MCHC RBC AUTO-ENTMCNC: 32.6 G/DL (ref 31.4–35)
MCV RBC AUTO: 94.3 FL (ref 79.6–97.8)
NRBC # BLD: 0 K/UL (ref 0–0.2)
PLATELET # BLD AUTO: 238 K/UL (ref 150–450)
PMV BLD AUTO: 8.7 FL (ref 9.4–12.3)
POTASSIUM SERPL-SCNC: 3.4 MMOL/L (ref 3.5–5.1)
RBC # BLD AUTO: 3.71 M/UL (ref 4.05–5.2)
SODIUM SERPL-SCNC: 137 MMOL/L (ref 136–145)
WBC # BLD AUTO: 14.1 K/UL (ref 4.3–11.1)

## 2022-07-18 PROCEDURE — 2580000003 HC RX 258: Performed by: FAMILY MEDICINE

## 2022-07-18 PROCEDURE — 6370000000 HC RX 637 (ALT 250 FOR IP): Performed by: FAMILY MEDICINE

## 2022-07-18 PROCEDURE — 6370000000 HC RX 637 (ALT 250 FOR IP): Performed by: NURSE PRACTITIONER

## 2022-07-18 PROCEDURE — 36415 COLL VENOUS BLD VENIPUNCTURE: CPT

## 2022-07-18 PROCEDURE — 71045 X-RAY EXAM CHEST 1 VIEW: CPT

## 2022-07-18 PROCEDURE — 83735 ASSAY OF MAGNESIUM: CPT

## 2022-07-18 PROCEDURE — 85027 COMPLETE CBC AUTOMATED: CPT

## 2022-07-18 PROCEDURE — 6360000002 HC RX W HCPCS: Performed by: FAMILY MEDICINE

## 2022-07-18 PROCEDURE — 1100000000 HC RM PRIVATE

## 2022-07-18 PROCEDURE — 80048 BASIC METABOLIC PNL TOTAL CA: CPT

## 2022-07-18 RX ORDER — POTASSIUM CHLORIDE 20 MEQ/1
40 TABLET, EXTENDED RELEASE ORAL ONCE
Status: COMPLETED | OUTPATIENT
Start: 2022-07-18 | End: 2022-07-18

## 2022-07-18 RX ADMIN — POTASSIUM CHLORIDE 40 MEQ: 20 TABLET, EXTENDED RELEASE ORAL at 09:10

## 2022-07-18 RX ADMIN — SODIUM CHLORIDE: 9 INJECTION, SOLUTION INTRAVENOUS at 05:20

## 2022-07-18 RX ADMIN — HYDROCODONE BITARTRATE AND ACETAMINOPHEN 1 TABLET: 5; 325 TABLET ORAL at 09:10

## 2022-07-18 RX ADMIN — ENOXAPARIN SODIUM 40 MG: 100 INJECTION SUBCUTANEOUS at 16:10

## 2022-07-18 RX ADMIN — HYDROCODONE BITARTRATE AND ACETAMINOPHEN 1 TABLET: 5; 325 TABLET ORAL at 16:12

## 2022-07-18 RX ADMIN — HYDROCODONE BITARTRATE AND ACETAMINOPHEN 1 TABLET: 5; 325 TABLET ORAL at 00:25

## 2022-07-18 RX ADMIN — CEFTRIAXONE 1000 MG: 1 INJECTION, POWDER, FOR SOLUTION INTRAMUSCULAR; INTRAVENOUS at 16:09

## 2022-07-18 RX ADMIN — SODIUM CHLORIDE, PRESERVATIVE FREE 10 ML: 5 INJECTION INTRAVENOUS at 20:43

## 2022-07-18 ASSESSMENT — PAIN DESCRIPTION - DESCRIPTORS: DESCRIPTORS: ACHING

## 2022-07-18 ASSESSMENT — PAIN SCALES - GENERAL
PAINLEVEL_OUTOF10: 0
PAINLEVEL_OUTOF10: 0
PAINLEVEL_OUTOF10: 6
PAINLEVEL_OUTOF10: 0

## 2022-07-18 ASSESSMENT — PAIN DESCRIPTION - LOCATION
LOCATION: ABDOMEN;FLANK
LOCATION: BACK

## 2022-07-18 ASSESSMENT — PAIN DESCRIPTION - ORIENTATION: ORIENTATION: RIGHT

## 2022-07-18 NOTE — PROGRESS NOTES
Hospitalist Progress Note   Admit Date:  2022  1:10 PM   Name:  Mike Pruitt   Age:  31 y.o. Sex:  female  :  1995   MRN:  [id number]   Room:  [facility]/    Presenting Complaint: Flank Pain    Reason(s) for Admission: Septicemia [facility]) [A41.9]  Acute pyelonephritis [N10]  Severe sepsis ([address] [A41.9, R65.20]  Leukocytosis, unspecified type [facility] Course & Interval History:   Mike Pruitt is a 31year old CFM with a PMH of ADD who presented to the ER with a complaint of CVA tenderness, fever, chills despite completely course of Macrobid for UTI    In the ER CT AP with findings consistent with pyelonephritis   Creatine 1.7 (creatine 0.5 in 2018), procal 2.9, WBC 27.6. Patient tachycardic     Patient admitted under hospitalist services for pyelonephritis failed OP therapy     Subjective/24hr Events (22): Reports right lower back pain/ Denies dysuria, frequency, malodorous urine. Endorses symptoms improvement since admit    ROS:  10 systems reviewed and negative except as noted above. Assessment & Plan:   Severe sepsis 2/2 pyelonephritis   22  met by HR, WBC, SYEDA   -Continue Ceftriaxone   -Urine culture and blood cultures NGTD  -Prn analgesia   -Pregnancy test pending  22  -Ucx positive for GNR; continue Rocephin. Likely switch to orals for discharge tomorrow      SYEDA (Resolved)  22  -Cr 1.00  - S/P Sepsis bolus. IV fluids and tx underlying infection. ADD   - Noted       Discharge Planning:      Likely discharge tomorrow     Diet:  ADULT DIET;  Regular  DVT PPx: Lovenox SQ  Code status: FULL CODE      Objective:   Patient Vitals for the past 24 hrs:   Temp Pulse Resp BP SpO2   22 1237 98.1 °F (36.7 °C) 98 18 121/76 97 %   22 0750 98.2 °F (36.8 °C) 88 18 120/80 99 %   22 0500 -- -- -- -- 94 %   22 0432 100 °F (37.8 °C) -- 18 122/75 --   22 0016 (!) 100.9 °F (38.3 °C) (!) 110 18 129/80 93 %   22 1923 98.1 °F (36.7 °C) 99 18 111/71 94 %   07/17/22 1715 100.3 °F (37.9 °C) (!) 109 -- -- --   07/17/22 1510 (!) 103.1 °F (39.5 °C) (!) 118 16 132/75 95 %       Estimated body mass index is 30.2 kg/m² as calculated from the following:    Height as of this encounter: 5' 2\" (1.575 m). Weight as of this encounter: 165 lb 2 oz (74.9 kg). Intake/Output Summary (Last 24 hours) at 7/18/2022 1335  Last data filed at 7/17/2022 1735  Gross per 24 hour   Intake 240 ml   Output --   Net 240 ml         Physical Exam:     Blood pressure 121/76, pulse 98, temperature 98.1 °F (36.7 °C), temperature source Oral, resp. rate 18, height 5' 2\" (1.575 m), weight 165 lb 2 oz (74.9 kg), SpO2 97 %. General:    Well nourished. No overt distress  Head:  Normocephalic, atraumatic  Eyes:  Sclerae appear normal.  Pupils equally round. ENT:  Nares appear normal, no drainage. Moist oral mucosa  Neck:  No restricted ROM. Trachea midline   CV:   RRR. No m/r/g. No jugular venous distension. Lungs:   CTAB. No wheezing, rhonchi, or rales. Respirations even, unlabored  Abdomen: Bowel sounds present. Soft, nontender, nondistended. Extremities: No cyanosis or clubbing. No edema  Skin:     No rashes and normal coloration. Warm and dry. Neuro:  CN II-XII grossly intact. A&Ox3  Psych:  Normal mood and affect.       I have reviewed ordered lab tests and independently visualized imaging below:    Recent Labs:  Recent Results (from the past 48 hour(s))   Lactic Acid    Collection Time: 07/16/22  2:31 PM   Result Value Ref Range    Lactic Acid, Plasma 1.5 0.4 - 2.0 MMOL/L   Culture, Blood 1    Collection Time: 07/16/22  2:31 PM    Specimen: Blood   Result Value Ref Range    Special Requests LEFT  Antecubital        Culture NO GROWTH 2 DAYS     POCT Urinalysis no Micro    Collection Time: 07/16/22  2:39 PM   Result Value Ref Range    Specific Gravity, Urine, POC <=1.005 1.001 - 1.023      pH, Urine, POC 6.0 5.0 - 9.0      Protein, Urine,  (A) NEG mg/dL Glucose, UA POC Negative NEG mg/dL    KETONES, Urine, POC Negative NEG mg/dL    Bilirubin, Urine, POC Negative NEG      Blood, UA POC MODERATE (A) NEG      URINE UROBILINOGEN POC 1.0 0.2 - 1.0 EU/dL    Nitrate, Urine, POC Negative NEG      Leukocyte Est, UA POC LARGE (A) NEG      Performed by: Estrella Hernandez    Culture, Urine    Collection Time: 07/16/22  3:05 PM    Specimen: URINE-CLEAN CATCH    URINE   Result Value Ref Range    Special Requests NO SPECIAL REQUESTS      Culture (A)       >100,000 COLONIES/mL GRAM NEGATIVE RODS IDENTIFICATION AND SUSCEPTIBILITY TO FOLLOW    Culture <10,000 COLONIES/mL MIXED SKIN RITIKA ISOLATED     Urinalysis, Micro    Collection Time: 07/16/22  3:15 PM   Result Value Ref Range    WBC, UA >100 0 /hpf    RBC, UA 0 0 /hpf    Epithelial Cells UA 20-50 0 /hpf    BACTERIA, URINE 3+ (H) 0 /hpf    Casts 0 0 /lpf    Crystals 0 0 /LPF    Mucus, UA 0 0 /lpf    OTHER OBSERVATIONS RESULTS VERIFIED MANUALLY     Blood Culture 2    Collection Time: 07/16/22  7:12 PM    Specimen: Blood   Result Value Ref Range    Special Requests RIGHT  FOREARM        Culture NO GROWTH 2 DAYS     CBC with Auto Differential    Collection Time: 07/17/22  6:12 AM   Result Value Ref Range    WBC 22.4 (H) 4.3 - 11.1 K/uL    RBC 3.79 (L) 4.05 - 5.2 M/uL    Hemoglobin 11.4 (L) 11.7 - 15.4 g/dL    Hematocrit 35.2 (L) 35.8 - 46.3 %    MCV 92.9 79.6 - 97.8 FL    MCH 30.1 26.1 - 32.9 PG    MCHC 32.4 31.4 - 35.0 g/dL    RDW 13.3 11.9 - 14.6 %    Platelets 965 532 - 741 K/uL    MPV 8.9 (L) 9.4 - 12.3 FL    nRBC 0.00 0.0 - 0.2 K/uL    Differential Type AUTOMATED      Seg Neutrophils 85 (H) 43 - 78 %    Lymphocytes 6 (L) 13 - 44 %    Monocytes 8 4.0 - 12.0 %    Eosinophils % 0 (L) 0.5 - 7.8 %    Basophils 0 0.0 - 2.0 %    Immature Granulocytes 1 0.0 - 5.0 %    Segs Absolute 19.0 (H) 1.7 - 8.2 K/UL    Absolute Lymph # 1.4 0.5 - 4.6 K/UL    Absolute Mono # 1.8 (H) 0.1 - 1.3 K/UL    Absolute Eos # 0.0 0.0 - 0.8 K/UL    Basophils Absolute 0.1 0.0 - 0.2 K/UL    Absolute Immature Granulocyte 0.1 0.0 - 0.5 K/UL   Comprehensive Metabolic Panel w/ Reflex to MG    Collection Time: 07/17/22  6:12 AM   Result Value Ref Range    Sodium 138 136 - 145 mmol/L    Potassium 3.6 3.5 - 5.1 mmol/L    Chloride 108 (H) 98 - 107 mmol/L    CO2 24 21 - 32 mmol/L    Anion Gap 6 (L) 7 - 16 mmol/L    Glucose 106 (H) 65 - 100 mg/dL    BUN 11 6 - 23 MG/DL    CREATININE 1.00 0.6 - 1.0 MG/DL    GFR African American >60 >60 ml/min/1.73m2    GFR Non- >60 >60 ml/min/1.73m2    Calcium 8.3 8.3 - 10.4 MG/DL    Total Bilirubin 0.6 0.2 - 1.1 MG/DL    ALT 28 12 - 65 U/L    AST 14 (L) 15 - 37 U/L    Alk Phosphatase 176 (H) 50 - 136 U/L    Total Protein 6.2 (L) 6.3 - 8.2 g/dL    Albumin 2.1 (L) 3.5 - 5.0 g/dL    Globulin 4.1 (H) 2.3 - 3.5 g/dL    Albumin/Globulin Ratio 0.5 (L) 1.2 - 3.5     Pregnancy, Urine    Collection Time: 07/17/22  3:49 PM   Result Value Ref Range    HCG(Urine) Pregnancy Test Negative NEG     CBC    Collection Time: 07/18/22  6:04 AM   Result Value Ref Range    WBC 14.1 (H) 4.3 - 11.1 K/uL    RBC 3.71 (L) 4.05 - 5.2 M/uL    Hemoglobin 11.4 (L) 11.7 - 15.4 g/dL    Hematocrit 35.0 (L) 35.8 - 46.3 %    MCV 94.3 79.6 - 97.8 FL    MCH 30.7 26.1 - 32.9 PG    MCHC 32.6 31.4 - 35.0 g/dL    RDW 14.0 11.9 - 14.6 %    Platelets 082 713 - 005 K/uL    MPV 8.7 (L) 9.4 - 12.3 FL    nRBC 0.00 0.0 - 0.2 K/uL   Basic Metabolic Panel    Collection Time: 07/18/22  6:04 AM   Result Value Ref Range    Sodium 137 136 - 145 mmol/L    Potassium 3.4 (L) 3.5 - 5.1 mmol/L    Chloride 105 98 - 107 mmol/L    CO2 26 21 - 32 mmol/L    Anion Gap 6 (L) 7 - 16 mmol/L    Glucose 116 (H) 65 - 100 mg/dL    BUN 9 6 - 23 MG/DL    CREATININE 0.90 0.6 - 1.0 MG/DL    GFR African American >60 >60 ml/min/1.73m2    GFR Non- >60 >60 ml/min/1.73m2    Calcium 8.5 8.3 - 10.4 MG/DL   Magnesium    Collection Time: 07/18/22  6:04 AM   Result Value Ref Range    Magnesium 2.0 1.8 - 2.4 mg/dL           Other Studies:    Current Meds:  Current Facility-Administered Medications   Medication Dose Route Frequency    0.9 % sodium chloride bolus  1,000 mL IntraVENous Once    0.9 % sodium chloride infusion   IntraVENous Continuous    sodium chloride flush 0.9 % injection 5-40 mL  5-40 mL IntraVENous 2 times per day    sodium chloride flush 0.9 % injection 5-40 mL  5-40 mL IntraVENous PRN    0.9 % sodium chloride infusion   IntraVENous PRN    enoxaparin (LOVENOX) injection 40 mg  40 mg SubCUTAneous Daily    ondansetron (ZOFRAN-ODT) disintegrating tablet 4 mg  4 mg Oral Q8H PRN    Or    ondansetron (ZOFRAN) injection 4 mg  4 mg IntraVENous Q6H PRN    polyethylene glycol (GLYCOLAX) packet 17 g  17 g Oral Daily PRN    acetaminophen (TYLENOL) tablet 650 mg  650 mg Oral Q6H PRN    Or    acetaminophen (TYLENOL) suppository 650 mg  650 mg Rectal Q6H PRN    cefTRIAXone (ROCEPHIN) 1000 mg IVPB in NS 50ml minibag  1,000 mg IntraVENous Q24H    HYDROcodone-acetaminophen (NORCO) 5-325 MG per tablet 1 tablet  1 tablet Oral Q6H PRN       Signed:  Leandra Cr, APRN - CNP    Part of this note may have been written by using a voice dictation software. The note has been proof read but may still contain some grammatical/other typographical errors.

## 2022-07-18 NOTE — CARE COORDINATION
Spoke to Ms. Raheel Gomez in room 617 about discharge planning. Prior to this admit, she was independent with all ADLs. She has two children, and works full-time as a  at [employer], and as a  at another. Works excuse letter provided per her request. She has a PCP, but no health insurance. Currently, she is on IV abx, and on supplemental oxygen at 3 lpm via NC. Probably no discharge needs, unless she cannot be weaned off of supplemental oxygen. CM will follow.        07/18/22 2710   Service Assessment   Patient Orientation Alert and Oriented   History Provided By Patient   Primary Caregiver Self   Support Systems Spouse/Significant Other   PCP Verified by CM Yes   Prior Functional Level Independent in ADLs/IADLs   Condition of Participation: Discharge Planning   The Plan for Transition of Care is related to the following treatment goals: home when stable

## 2022-07-18 NOTE — CARE COORDINATION
[facility]  [address] [id number]  Phone: [phone]             July 18, 2022    Patient: Denisse Russell   YOB: 1995   Date of Visit: 7/16/2022       To Whom It May Concern:    Denisse Russell was seen and treated in our facility  beginning 7/16/2022 and is an inpatient as of today. Discharge date is to be determined. Please call [phone] if we may be of further assistance.       Sincerely,       Jami Braden RN Case Manager           Signature:__________________________________

## 2022-07-19 VITALS
HEART RATE: 88 BPM | TEMPERATURE: 98.8 F | WEIGHT: 160.94 LBS | DIASTOLIC BLOOD PRESSURE: 76 MMHG | OXYGEN SATURATION: 97 % | RESPIRATION RATE: 18 BRPM | SYSTOLIC BLOOD PRESSURE: 119 MMHG | BODY MASS INDEX: 29.62 KG/M2 | HEIGHT: 62 IN

## 2022-07-19 LAB
ANION GAP SERPL CALC-SCNC: 3 MMOL/L (ref 7–16)
BACTERIA SPEC CULT: ABNORMAL
BACTERIA SPEC CULT: ABNORMAL
BUN SERPL-MCNC: 10 MG/DL (ref 6–23)
CALCIUM SERPL-MCNC: 8.9 MG/DL (ref 8.3–10.4)
CHLORIDE SERPL-SCNC: 105 MMOL/L (ref 98–107)
CO2 SERPL-SCNC: 29 MMOL/L (ref 21–32)
CREAT SERPL-MCNC: 0.7 MG/DL (ref 0.6–1)
ERYTHROCYTE [DISTWIDTH] IN BLOOD BY AUTOMATED COUNT: 13.9 % (ref 11.9–14.6)
GLUCOSE SERPL-MCNC: 95 MG/DL (ref 65–100)
HCT VFR BLD AUTO: 34 % (ref 35.8–46.3)
HGB BLD-MCNC: 11.1 G/DL (ref 11.7–15.4)
MCH RBC QN AUTO: 30.1 PG (ref 26.1–32.9)
MCHC RBC AUTO-ENTMCNC: 32.6 G/DL (ref 31.4–35)
MCV RBC AUTO: 92.1 FL (ref 79.6–97.8)
NRBC # BLD: 0 K/UL (ref 0–0.2)
PLATELET # BLD AUTO: 288 K/UL (ref 150–450)
PMV BLD AUTO: 8.8 FL (ref 9.4–12.3)
POTASSIUM SERPL-SCNC: 4.1 MMOL/L (ref 3.5–5.1)
RBC # BLD AUTO: 3.69 M/UL (ref 4.05–5.2)
SERVICE CMNT-IMP: ABNORMAL
SODIUM SERPL-SCNC: 137 MMOL/L (ref 136–145)
WBC # BLD AUTO: 10.6 K/UL (ref 4.3–11.1)

## 2022-07-19 PROCEDURE — 80048 BASIC METABOLIC PNL TOTAL CA: CPT

## 2022-07-19 PROCEDURE — 36415 COLL VENOUS BLD VENIPUNCTURE: CPT

## 2022-07-19 PROCEDURE — 2580000003 HC RX 258: Performed by: FAMILY MEDICINE

## 2022-07-19 PROCEDURE — 85027 COMPLETE CBC AUTOMATED: CPT

## 2022-07-19 PROCEDURE — 6370000000 HC RX 637 (ALT 250 FOR IP): Performed by: FAMILY MEDICINE

## 2022-07-19 RX ORDER — HYDROCODONE BITARTRATE AND ACETAMINOPHEN 5; 325 MG/1; MG/1
1 TABLET ORAL EVERY 6 HOURS PRN
Qty: 12 TABLET | Refills: 0 | Status: SHIPPED | OUTPATIENT
Start: 2022-07-19 | End: 2022-07-22

## 2022-07-19 RX ORDER — SULFAMETHOXAZOLE AND TRIMETHOPRIM 800; 160 MG/1; MG/1
1 TABLET ORAL 2 TIMES DAILY
Qty: 14 TABLET | Refills: 0 | Status: SHIPPED | OUTPATIENT
Start: 2022-07-19 | End: 2022-07-26

## 2022-07-19 RX ORDER — SULFAMETHOXAZOLE AND TRIMETHOPRIM 800; 160 MG/1; MG/1
1 TABLET ORAL 2 TIMES DAILY
Qty: 10 TABLET | Refills: 0 | Status: SHIPPED | OUTPATIENT
Start: 2022-07-19 | End: 2022-07-19 | Stop reason: SDUPTHER

## 2022-07-19 RX ADMIN — ACETAMINOPHEN 650 MG: 325 TABLET ORAL at 00:40

## 2022-07-19 RX ADMIN — HYDROCODONE BITARTRATE AND ACETAMINOPHEN 1 TABLET: 5; 325 TABLET ORAL at 09:22

## 2022-07-19 RX ADMIN — SODIUM CHLORIDE, PRESERVATIVE FREE 10 ML: 5 INJECTION INTRAVENOUS at 09:22

## 2022-07-19 ASSESSMENT — PAIN DESCRIPTION - ORIENTATION: ORIENTATION: RIGHT

## 2022-07-19 ASSESSMENT — PAIN SCALES - GENERAL: PAINLEVEL_OUTOF10: 5

## 2022-07-19 ASSESSMENT — PAIN - FUNCTIONAL ASSESSMENT: PAIN_FUNCTIONAL_ASSESSMENT: PREVENTS OR INTERFERES WITH MANY ACTIVE NOT PASSIVE ACTIVITIES

## 2022-07-19 ASSESSMENT — PAIN DESCRIPTION - LOCATION: LOCATION: FLANK

## 2022-07-19 NOTE — DISCHARGE SUMMARY
Hospitalist Discharge Summary   Admit Date:  2022  1:10 PM   DC Note date: 2022  Name:  Leigh Plunkett   Age:  31 y.o. Sex:  female  :  1995   MRN:  [id number]   Room:  [facility]  PCP:  Prentis Libman, MD    Presenting Complaint: Flank Pain    Initial Admission Diagnosis: Septicemia ([facility] [address] [A41.9]  Acute pyelonephritis [N10]  Severe sepsis ([facility] [address] [A41.9, R65.20]  Leukocytosis, unspecified type [D72.829]     Problem List for this Hospitalization:  Did Patient have Sepsis (YES OR NO): Yes    Hospital Course:  Leigh Plunkett is a 31year old CFM with a PMH of ADD who presented to the ER with a complaint of CVA tenderness, fever, chills despite completely course of Macrobid for UTI     In the ER CT AP with findings consistent with pyelonephritis  Creatine 1.7 (creatine 0.5 in 2018), procal 2.9, WBC 27.6. Patient tachycardic     Patient admitted under hospitalist services for pyelonephritis failed OP therapy. Started on empiric Rocephin and rapidly responded to therapy. Sepsis resolved. White count normalized. Bcx remained negative. Ucx positive for Ecoli. Patient endorsed improvement in back pain and appetite. Denied N/V, dysuria, frequency, malodorous urine, fever, chills and patient discharged home with abx to completion and order for PCP follow-up. Patient denies sulfa allergy       Disposition: Home  Diet: ADULT DIET; Regular  Code Status: Full Code No additional code details    Follow Up Orders:  No follow-ups on file. Follow up labs/diagnostics (ultimately defer to outpatient provider):      Time spent in patient discharge and coordination 38 minutes. Plan was discussed with patient/spouse. All questions answered. Patient was stable at time of discharge. Instructions given to call a physician or return if any concerns.     Discharge Info:      Medication List        START taking these medications      HYDROcodone-acetaminophen 5-325 MG per tablet  Commonly known as: Lisa Delgadillo  Take 1 tablet by mouth every 6 hours as needed for Pain for up to 3 days. sulfamethoxazole-trimethoprim 800-160 MG per tablet  Commonly known as: BACTRIM DS;SEPTRA DS  Take 1 tablet by mouth in the morning and 1 tablet before bedtime. Do all this for 5 days. CONTINUE taking these medications      amphetamine-dextroamphetamine 30 MG extended release capsule  Commonly known as: ADDERALL XR            STOP taking these medications      Mirena (52 MG) IUD 52 mg  Generic drug: levonorgestrel               Where to Get Your Medications        These medications were sent to [facility]/pharmacy #[id number]- [place], [address] [address], [address]      Phone: [phone]   sulfamethoxazole-trimethoprim 800-160 MG per tablet       Information about where to get these medications is not yet available    Ask your nurse or doctor about these medications  HYDROcodone-acetaminophen 5-325 MG per tablet         Procedures done this admission:  * No surgery found *    Consults this admission:  None    Echocardiogram/EKG results:      No results found for this or any previous visit (from the past 4464 hour(s)). Diagnostic Imaging/Tests:   CT ABDOMEN PELVIS W IV CONTRAST Additional Contrast? None    Result Date: 7/16/2022  CT ABDOMEN AND PELVIS WITH CONTRAST 7/16/2022 2:32 PM History:  Patient ambulatory to triage with mask in place. Patient reports?she was recently dx with UTI and took full course of ABX. Pt reports chills, lower back pain, abd pain, weakness and fever. ?; TECHNIQUE: The patient received 100 mL Isovue-370 nonionic IV contrast. Axial images were obtained through the abdomen and pelvis. Coronal reformatted images were generated. All CT scans at this facility used dose modulation, interactive reconstruction and/or weight based dosing when appropriate to reduce radiation dose to as low as reasonably achievable.  Comparison: None available Findings: Included portions of the lung bases are clear. ABDOMEN: Gallbladder: Normal. Liver: No focal hepatic lesions or biliary ductal dilatation. Pancreas: Normal. Spleen: Normal. Adrenal glands: Normal. Kidneys: The right kidney is enlarged with a striated nephrogram. Findings suggest pyelonephritis. There are no visible renal or ureteral calculi. Bowel: The appendix is normal in appearance. Small bowel loops are normal in caliber. Retroperitoneum:No adenopathy. Abdominal aorta is normal in caliber. PELVIS:The bladder is normal in appearance. There is a small amount of free pelvic fluid. An intrauterine contraceptive device is present within the uterus. Bones: There are no aggressive osseous lesions. Abnormal appearance of the right kidney with findings most in keeping with  pyelonephritis.         Labs: Results:       BMP, Mg, Phos Recent Labs     07/17/22  0612 07/18/22  0604 07/19/22  0445    137 137   K 3.6 3.4* 4.1   * 105 105   CO2 24 26 29   BUN 11 9 10   MG  --  2.0  --       CBC Recent Labs     07/17/22  0612 07/18/22  0604 07/19/22  0445   WBC 22.4* 14.1* 10.6   RBC 3.79* 3.71* 3.69*   HGB 11.4* 11.4* 11.1*   HCT 35.2* 35.0* 34.0*    238 288      LFT Recent Labs     07/16/22  1219 07/17/22  0612   ALT 43 28   GLOB 4.7* 4.1*      Cardiac Testing No results found for: BNP, CPK, RCK1, CKMB   Coagulation Tests No results found for: INR, APTT   A1c No results found for: HBA1C   Lipid Panel No results found for: CHOL, CHOLPOCT, CHOLX, CHLST, CHOLV, 540478, HDL, HDLC, LDL, LDLC, 298446, VLDLC, VLDL, TGLX, TRIGL   Thyroid Panel No results found for: TSH, T4, FT4     Most Recent UA Lab Results   Component Value Date/Time    MUCUS 0 07/16/2022 03:15 PM    UCOM RESULTS VERIFIED MANUALLY 07/16/2022 03:15 PM          All Labs from Last 24 Hrs:  Recent Results (from the past 24 hour(s))   CBC    Collection Time: 07/19/22  4:45 AM   Result Value Ref Range    WBC 10.6 4.3 - 11.1 K/uL    RBC 3.69 (L) 4.05 - 5.2 M/uL    Hemoglobin 11.1 (L) 11.7 - 15.4 g/dL    Hematocrit 34.0 (L) 35.8 - 46.3 %    MCV 92.1 79.6 - 97.8 FL    MCH 30.1 26.1 - 32.9 PG    MCHC 32.6 31.4 - 35.0 g/dL    RDW 13.9 11.9 - 14.6 %    Platelets 665 819 - 906 K/uL    MPV 8.8 (L) 9.4 - 12.3 FL    nRBC 0.00 0.0 - 0.2 K/uL   Basic Metabolic Panel    Collection Time: 07/19/22  4:45 AM   Result Value Ref Range    Sodium 137 136 - 145 mmol/L    Potassium 4.1 3.5 - 5.1 mmol/L    Chloride 105 98 - 107 mmol/L    CO2 29 21 - 32 mmol/L    Anion Gap 3 (L) 7 - 16 mmol/L    Glucose 95 65 - 100 mg/dL    BUN 10 6 - 23 MG/DL    CREATININE 0.70 0.6 - 1.0 MG/DL    GFR African American >60 >60 ml/min/1.73m2    GFR Non- >60 >60 ml/min/1.73m2    Calcium 8.9 8.3 - 10.4 MG/DL       Current Med List in Hospital:   Current Facility-Administered Medications   Medication Dose Route Frequency    0.9 % sodium chloride bolus  1,000 mL IntraVENous Once    0.9 % sodium chloride infusion   IntraVENous Continuous    sodium chloride flush 0.9 % injection 5-40 mL  5-40 mL IntraVENous 2 times per day    sodium chloride flush 0.9 % injection 5-40 mL  5-40 mL IntraVENous PRN    0.9 % sodium chloride infusion   IntraVENous PRN    enoxaparin (LOVENOX) injection 40 mg  40 mg SubCUTAneous Daily    ondansetron (ZOFRAN-ODT) disintegrating tablet 4 mg  4 mg Oral Q8H PRN    Or    ondansetron (ZOFRAN) injection 4 mg  4 mg IntraVENous Q6H PRN    polyethylene glycol (GLYCOLAX) packet 17 g  17 g Oral Daily PRN    acetaminophen (TYLENOL) tablet 650 mg  650 mg Oral Q6H PRN    Or    acetaminophen (TYLENOL) suppository 650 mg  650 mg Rectal Q6H PRN    cefTRIAXone (ROCEPHIN) 1000 mg IVPB in NS 50ml minibag  1,000 mg IntraVENous Q24H    HYDROcodone-acetaminophen (NORCO) 5-325 MG per tablet 1 tablet  1 tablet Oral Q6H PRN       Allergies   Allergen Reactions    Keflex [Cephalexin] Other (See Comments)    Macrolides And Ketolides Other (See Comments)    Pcn [Penicillins] Other (See Comments)    Sulfa Antibiotics Other (See Comments)       There is no immunization history on file for this patient. Recent Vital Data:  Patient Vitals for the past 24 hrs:   Temp Pulse Resp BP SpO2   07/19/22 0826 98.6 °F (37 °C) 85 17 112/71 96 %   07/19/22 0450 98.4 °F (36.9 °C) 75 18 114/69 94 %   07/19/22 0035 (!) 100.7 °F (38.2 °C) 90 19 137/83 94 %   07/18/22 1936 99.3 °F (37.4 °C) 91 16 121/81 96 %   07/18/22 1731 (!) 102.4 °F (39.1 °C) 97 18 130/82 93 %   07/18/22 1445 -- -- -- -- 96 %   07/18/22 1237 98.1 °F (36.7 °C) 98 18 121/76 97 %         Estimated body mass index is 29.44 kg/m² as calculated from the following:    Height as of this encounter: 5' 2\" (1.575 m). Weight as of this encounter: 160 lb 15 oz (73 kg). No intake or output data in the 24 hours ending 07/19/22 0953      Physical Exam:    General:    Well nourished. No overt distress  Head:  Normocephalic, atraumatic  Eyes:  Sclerae appear normal.  Pupils equally round. HENT:  Nares appear normal, no drainage. Moist mucous membranes  Neck:  No restricted ROM. Trachea midline  CV:   RRR. No m/r/g. No JVD  Lungs:   CTAB. No wheezing, rhonchi, or rales. Even, unlabored  Abdomen:   Soft, nontender, nondistended. Extremities: Warm and dry. No cyanosis or clubbing. No edema. Skin:     No rashes. Normal coloration  Neuro:  CN II-XII grossly intact. Psych:  Normal mood and affect. Signed:  TYRONE Dixon CNP    Part of this note may have been written by using a voice dictation software. The note has been proof read but may still contain some grammatical/other typographical errors.

## 2022-07-19 NOTE — PLAN OF CARE
Problem: Discharge Planning  Goal: Discharge to home or other facility with appropriate resources  Outcome: Progressing Towards Goal     Problem: Pain  Goal: Verbalizes/displays adequate comfort level or baseline comfort level  Outcome: Progressing Towards Goal

## 2022-07-19 NOTE — PLAN OF CARE
Problem: Discharge Planning  Goal: Discharge to home or other facility with appropriate resources  Outcome: Resolved/Met     Problem: Pain  Goal: Verbalizes/displays adequate comfort level or baseline comfort level  Outcome: Resolved/Met

## 2022-07-21 LAB
BACTERIA SPEC CULT: NORMAL
BACTERIA SPEC CULT: NORMAL
SERVICE CMNT-IMP: NORMAL
SERVICE CMNT-IMP: NORMAL

## 2023-05-28 ENCOUNTER — APPOINTMENT (OUTPATIENT)
Dept: GENERAL RADIOLOGY | Age: 28
End: 2023-05-28
Payer: OTHER MISCELLANEOUS

## 2023-05-28 ENCOUNTER — HOSPITAL ENCOUNTER (EMERGENCY)
Age: 28
Discharge: HOME | End: 2023-05-28
Attending: EMERGENCY MEDICINE
Payer: OTHER MISCELLANEOUS

## 2023-05-28 VITALS
BODY MASS INDEX: 26.68 KG/M2 | SYSTOLIC BLOOD PRESSURE: 134 MMHG | WEIGHT: 145 LBS | TEMPERATURE: 98.3 F | OXYGEN SATURATION: 100 % | HEART RATE: 82 BPM | HEIGHT: 62 IN | DIASTOLIC BLOOD PRESSURE: 89 MMHG | RESPIRATION RATE: 17 BRPM

## 2023-05-28 DIAGNOSIS — M54.12 CERVICAL RADICULOPATHY: Primary | ICD-10-CM

## 2023-05-28 PROCEDURE — 72040 X-RAY EXAM NECK SPINE 2-3 VW: CPT

## 2023-05-28 PROCEDURE — 96372 THER/PROPH/DIAG INJ SC/IM: CPT

## 2023-05-28 PROCEDURE — 99284 EMERGENCY DEPT VISIT MOD MDM: CPT

## 2023-05-28 PROCEDURE — 6360000002 HC RX W HCPCS: Performed by: PHYSICIAN ASSISTANT

## 2023-05-28 RX ORDER — CYCLOBENZAPRINE HCL 10 MG
10 TABLET ORAL NIGHTLY
Qty: 7 TABLET | Refills: 0 | Status: SHIPPED | OUTPATIENT
Start: 2023-05-28 | End: 2023-06-04

## 2023-05-28 RX ORDER — DEXAMETHASONE SODIUM PHOSPHATE 10 MG/ML
10 INJECTION INTRAMUSCULAR; INTRAVENOUS ONCE
Status: DISCONTINUED | OUTPATIENT
Start: 2023-05-28 | End: 2023-05-28

## 2023-05-28 RX ORDER — DEXAMETHASONE SODIUM PHOSPHATE 10 MG/ML
10 INJECTION INTRAMUSCULAR; INTRAVENOUS ONCE
Status: COMPLETED | OUTPATIENT
Start: 2023-05-28 | End: 2023-05-28

## 2023-05-28 RX ORDER — PREDNISONE 50 MG/1
50 TABLET ORAL DAILY
Qty: 5 TABLET | Refills: 0 | Status: SHIPPED | OUTPATIENT
Start: 2023-05-28 | End: 2023-06-02

## 2023-05-28 RX ADMIN — DEXAMETHASONE SODIUM PHOSPHATE 10 MG: 10 INJECTION INTRAMUSCULAR; INTRAVENOUS at 23:20

## 2023-05-28 ASSESSMENT — PAIN DESCRIPTION - LOCATION: LOCATION: SHOULDER

## 2023-05-28 ASSESSMENT — PAIN DESCRIPTION - ORIENTATION: ORIENTATION: LEFT

## 2023-05-28 ASSESSMENT — LIFESTYLE VARIABLES
HOW OFTEN DO YOU HAVE A DRINK CONTAINING ALCOHOL: 2-4 TIMES A MONTH
HOW MANY STANDARD DRINKS CONTAINING ALCOHOL DO YOU HAVE ON A TYPICAL DAY: 1 OR 2

## 2023-05-28 ASSESSMENT — PAIN SCALES - GENERAL: PAINLEVEL_OUTOF10: 4

## 2023-05-28 ASSESSMENT — PAIN - FUNCTIONAL ASSESSMENT: PAIN_FUNCTIONAL_ASSESSMENT: 0-10

## 2023-05-29 NOTE — ED NOTES
I have reviewed discharge instructions with the patient. The patient verbalized understanding. Patient left ED via Discharge Method: ambulatory to Home with self. Opportunity for questions and clarification provided. Patient given 3 scripts. To continue your aftercare when you leave the hospital, you may receive an automated call from our care team to check in on how you are doing. This is a free service and part of our promise to provide the best care and service to meet your aftercare needs.  If you have questions, or wish to unsubscribe from this service please call [phone]. Thank you for Choosing our [facility] Emergency Department.        Omi Ellsworth RN  05/28/23 0443

## 2023-05-29 NOTE — DISCHARGE INSTRUCTIONS
X-ray here today looks okay. I believe you are dealing with a recall radiculopathy. Utilize the medications as prescribed. I have given you Ortho contact information should your symptoms linger and you need follow-up. Return to the ED as needed.
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
## (undated) DEVICE — MEDI-VAC NON-CONDUCTIVE SUCTION TUBING: Brand: CARDINAL HEALTH

## (undated) DEVICE — SUTURE VCRL SZ 4-0 L27IN ABSRB UD L60MM KS STR REV CUT NDL J662H

## (undated) DEVICE — AMD ANTIMICROBIAL GAUZE SPONGES,12 PLY USP TYPE VII, 0.2% POLYHEXAMETHYLENE BIGUANIDE HCI (PHMB): Brand: CURITY

## (undated) DEVICE — SOLUTION IRRIG 1000ML H2O STRL BLT

## (undated) DEVICE — SUT CHRMC 1 36IN CTX BRN --

## (undated) DEVICE — PENCIL ES L3M BTTN SWCH S STL HEX LOK BLDE ELECTRD HOLSTER

## (undated) DEVICE — REM POLYHESIVE ADULT PATIENT RETURN ELECTRODE: Brand: VALLEYLAB

## (undated) DEVICE — SURGICAL PROCEDURE PACK C SECT CDS

## (undated) DEVICE — Device: Brand: PORTEX

## (undated) DEVICE — KENDALL SCD EXPRESS SLEEVES, KNEE LENGTH, MEDIUM: Brand: KENDALL SCD

## (undated) DEVICE — SUTURE VCRL SZ 1 L27IN ABSRB UD CT-1 L36MM 1/2 CIR J261H

## (undated) DEVICE — CATH FOL TY IC BAG 16FR 2000ML -- CONVERT TO ITEM 363158

## (undated) DEVICE — (D)PREP SKN CHLRAPRP APPL 26ML -- CONVERT TO ITEM 371833

## (undated) DEVICE — SOLUTION IV 1000ML 0.9% SOD CHL

## (undated) DEVICE — AMD ANTIMICROBIAL NON-ADHERENT PAD,0.2% POLYHEXAMETHYLENE BIGUANIDE HCI (PHMB): Brand: TELFA

## (undated) DEVICE — STERILE POLYISOPRENE POWDER-FREE SURGICAL GLOVES: Brand: PROTEXIS